# Patient Record
Sex: FEMALE | Race: BLACK OR AFRICAN AMERICAN | NOT HISPANIC OR LATINO | Employment: FULL TIME | URBAN - METROPOLITAN AREA
[De-identification: names, ages, dates, MRNs, and addresses within clinical notes are randomized per-mention and may not be internally consistent; named-entity substitution may affect disease eponyms.]

---

## 2017-10-13 ENCOUNTER — APPOINTMENT (EMERGENCY)
Dept: CT IMAGING | Facility: HOSPITAL | Age: 42
End: 2017-10-13

## 2017-10-13 ENCOUNTER — HOSPITAL ENCOUNTER (EMERGENCY)
Facility: HOSPITAL | Age: 42
Discharge: HOME | End: 2017-10-13
Attending: EMERGENCY MEDICINE | Admitting: EMERGENCY MEDICINE

## 2017-10-13 VITALS
OXYGEN SATURATION: 100 % | RESPIRATION RATE: 16 BRPM | HEART RATE: 58 BPM | TEMPERATURE: 98.7 F | SYSTOLIC BLOOD PRESSURE: 149 MMHG | DIASTOLIC BLOOD PRESSURE: 79 MMHG | WEIGHT: 183 LBS

## 2017-10-13 DIAGNOSIS — R10.31 RIGHT LOWER QUADRANT ABDOMINAL PAIN: Primary | ICD-10-CM

## 2017-10-13 LAB
ANION GAP SERPL CALCULATED.3IONS-SCNC: 8 MMOL/L (ref 4–13)
BASOPHILS # BLD AUTO: 0.05 THOUSANDS/ΜL (ref 0–0.1)
BASOPHILS NFR BLD AUTO: 1 % (ref 0–1)
BUN SERPL-MCNC: 11 MG/DL (ref 5–25)
CALCIUM SERPL-MCNC: 9.6 MG/DL (ref 8.3–10.1)
CHLORIDE SERPL-SCNC: 106 MMOL/L (ref 100–108)
CLARITY, POC: CLEAR
CO2 SERPL-SCNC: 29 MMOL/L (ref 21–32)
COLOR, POC: YELLOW
CREAT SERPL-MCNC: 0.91 MG/DL (ref 0.6–1.3)
EOSINOPHIL # BLD AUTO: 0.09 THOUSAND/ΜL (ref 0–0.61)
EOSINOPHIL NFR BLD AUTO: 1 % (ref 0–6)
ERYTHROCYTE [DISTWIDTH] IN BLOOD BY AUTOMATED COUNT: 15.6 % (ref 11.6–15.1)
EXT BILIRUBIN, UA: NEGATIVE
EXT BLOOD URINE: NORMAL
EXT GLUCOSE, UA: NEGATIVE
EXT KETONES: NEGATIVE
EXT NITRITE, UA: NEGATIVE
EXT PH, UA: 6.5
EXT PREG TEST URINE: NEGATIVE
EXT PROTEIN, UA: NORMAL
EXT SPECIFIC GRAVITY, UA: 1.02
EXT UROBILINOGEN: 0.2
GFR SERPL CREATININE-BSD FRML MDRD: 91 ML/MIN/1.73SQ M
GLUCOSE SERPL-MCNC: 87 MG/DL (ref 65–140)
HCT VFR BLD AUTO: 37.6 % (ref 34.8–46.1)
HGB BLD-MCNC: 11.6 G/DL (ref 11.5–15.4)
LYMPHOCYTES # BLD AUTO: 2.58 THOUSANDS/ΜL (ref 0.6–4.47)
LYMPHOCYTES NFR BLD AUTO: 34 % (ref 14–44)
MCH RBC QN AUTO: 26.5 PG (ref 26.8–34.3)
MCHC RBC AUTO-ENTMCNC: 30.9 G/DL (ref 31.4–37.4)
MCV RBC AUTO: 86 FL (ref 82–98)
MONOCYTES # BLD AUTO: 0.72 THOUSAND/ΜL (ref 0.17–1.22)
MONOCYTES NFR BLD AUTO: 10 % (ref 4–12)
NEUTROPHILS # BLD AUTO: 4.17 THOUSANDS/ΜL (ref 1.85–7.62)
NEUTS SEG NFR BLD AUTO: 54 % (ref 43–75)
PLATELET # BLD AUTO: 376 THOUSANDS/UL (ref 149–390)
PMV BLD AUTO: 9 FL (ref 8.9–12.7)
POTASSIUM SERPL-SCNC: 3.7 MMOL/L (ref 3.5–5.3)
RBC # BLD AUTO: 4.37 MILLION/UL (ref 3.81–5.12)
SODIUM SERPL-SCNC: 143 MMOL/L (ref 136–145)
WBC # BLD AUTO: 7.61 THOUSAND/UL (ref 4.31–10.16)
WBC # BLD EST: NEGATIVE 10*3/UL

## 2017-10-13 PROCEDURE — 36415 COLL VENOUS BLD VENIPUNCTURE: CPT | Performed by: EMERGENCY MEDICINE

## 2017-10-13 PROCEDURE — 85025 COMPLETE CBC W/AUTO DIFF WBC: CPT | Performed by: EMERGENCY MEDICINE

## 2017-10-13 PROCEDURE — 99284 EMERGENCY DEPT VISIT MOD MDM: CPT

## 2017-10-13 PROCEDURE — 74177 CT ABD & PELVIS W/CONTRAST: CPT

## 2017-10-13 PROCEDURE — 81002 URINALYSIS NONAUTO W/O SCOPE: CPT | Performed by: EMERGENCY MEDICINE

## 2017-10-13 PROCEDURE — 96361 HYDRATE IV INFUSION ADD-ON: CPT

## 2017-10-13 PROCEDURE — 96360 HYDRATION IV INFUSION INIT: CPT

## 2017-10-13 PROCEDURE — 81025 URINE PREGNANCY TEST: CPT | Performed by: EMERGENCY MEDICINE

## 2017-10-13 PROCEDURE — 80048 BASIC METABOLIC PNL TOTAL CA: CPT | Performed by: EMERGENCY MEDICINE

## 2017-10-13 RX ORDER — NAPROXEN 500 MG/1
500 TABLET ORAL 2 TIMES DAILY PRN
Qty: 20 TABLET | Refills: 0 | Status: SHIPPED | OUTPATIENT
Start: 2017-10-13 | End: 2020-01-27

## 2017-10-13 RX ADMIN — IOHEXOL 100 ML: 350 INJECTION, SOLUTION INTRAVENOUS at 18:51

## 2017-10-13 RX ADMIN — SODIUM CHLORIDE 1000 ML: 0.9 INJECTION, SOLUTION INTRAVENOUS at 18:03

## 2017-10-13 NOTE — ED PROVIDER NOTES
History  Chief Complaint   Patient presents with    Abdominal Pain     c/o right lower abdominal pain, which started 07/2017  History provided by:  Patient and spouse  Abdominal Pain   Pain location:  RLQ  Pain quality: aching    Pain radiates to:  Does not radiate  Pain severity:  Moderate  Duration: for past 3 months, pain lasts about 2 weeks, starts middle and ends at and of periods each month  Timing:  Intermittent  Progression:  Worsening  Chronicity:  New  Context: not trauma    Relieved by:  None tried  Worsened by:  Nothing  Ineffective treatments:  None tried  Associated symptoms: no anorexia, no chest pain, no chills, no constipation, no cough, no diarrhea, no dysuria, no fever, no hematemesis, no hematochezia, no hematuria, no nausea, no shortness of breath, no sore throat, no vaginal bleeding, no vaginal discharge and no vomiting    Risk factors: not pregnant        None       No past medical history on file  Past Surgical History:   Procedure Laterality Date    BREAST SURGERY      TUBAL LIGATION         No family history on file  I have reviewed and agree with the history as documented  Social History   Substance Use Topics    Smoking status: Never Smoker    Smokeless tobacco: Never Used    Alcohol use No        Review of Systems   Constitutional: Negative for activity change, chills, diaphoresis and fever  HENT: Negative for congestion, sinus pressure and sore throat  Eyes: Negative for pain and visual disturbance  Respiratory: Negative for cough, chest tightness, shortness of breath, wheezing and stridor  Cardiovascular: Negative for chest pain and palpitations  Gastrointestinal: Positive for abdominal pain  Negative for abdominal distention, anorexia, constipation, diarrhea, hematemesis, hematochezia, nausea and vomiting  Genitourinary: Negative for dysuria, frequency, hematuria, vaginal bleeding and vaginal discharge     Musculoskeletal: Negative for neck pain and neck stiffness  Skin: Negative for rash  Neurological: Negative for dizziness, speech difficulty, light-headedness, numbness and headaches  Physical Exam  ED Triage Vitals [10/13/17 1714]   Temperature Pulse Respirations Blood Pressure SpO2   98 7 °F (37 1 °C) 60 16 135/78 99 %      Temp Source Heart Rate Source Patient Position - Orthostatic VS BP Location FiO2 (%)   Oral Monitor Sitting Right arm --      Pain Score       4           Physical Exam   Constitutional: She is oriented to person, place, and time  She appears well-developed  No distress  HENT:   Head: Normocephalic and atraumatic  Eyes: Pupils are equal, round, and reactive to light  Neck: Normal range of motion  Neck supple  No tracheal deviation present  Cardiovascular: Normal rate, regular rhythm, normal heart sounds and intact distal pulses  No murmur heard  Pulmonary/Chest: Effort normal and breath sounds normal  No stridor  No respiratory distress  Abdominal: Soft  She exhibits no distension  There is no tenderness  There is no rebound and no guarding  Musculoskeletal: Normal range of motion  Neurological: She is alert and oriented to person, place, and time  Skin: Skin is warm and dry  She is not diaphoretic  No erythema  No pallor  Psychiatric: She has a normal mood and affect  Vitals reviewed        ED Medications  Medications   sodium chloride 0 9 % bolus 1,000 mL (0 mL Intravenous Stopped 10/13/17 1947)   iohexol (OMNIPAQUE) 350 MG/ML injection (SINGLE-DOSE) 100 mL (100 mL Intravenous Given 10/13/17 1851)       Diagnostic Studies  Labs Reviewed   CBC AND DIFFERENTIAL - Abnormal        Result Value Ref Range Status    MCH 26 5 (*) 26 8 - 34 3 pg Final    MCHC 30 9 (*) 31 4 - 37 4 g/dL Final    RDW 15 6 (*) 11 6 - 15 1 % Final    WBC 7 61  4 31 - 10 16 Thousand/uL Final    RBC 4 37  3 81 - 5 12 Million/uL Final    Hemoglobin 11 6  11 5 - 15 4 g/dL Final    Hematocrit 37 6  34 8 - 46 1 % Final    MCV 86  82 - 98 fL Final    MPV 9 0  8 9 - 12 7 fL Final    Platelets 494  617 - 390 Thousands/uL Final    Neutrophils Relative 54  43 - 75 % Final    Lymphocytes Relative 34  14 - 44 % Final    Monocytes Relative 10  4 - 12 % Final    Eosinophils Relative 1  0 - 6 % Final    Basophils Relative 1  0 - 1 % Final    Neutrophils Absolute 4 17  1 85 - 7 62 Thousands/µL Final    Lymphocytes Absolute 2 58  0 60 - 4 47 Thousands/µL Final    Monocytes Absolute 0 72  0 17 - 1 22 Thousand/µL Final    Eosinophils Absolute 0 09  0 00 - 0 61 Thousand/µL Final    Basophils Absolute 0 05  0 00 - 0 10 Thousands/µL Final   POCT URINALYSIS DIPSTICK - Normal    Color, UA Yellow   Final    Clarity, UA Clear   Final    EXT Glucose, UA Negative   Final    EXT Bilirubin, UA (Ref: Negative) Negative   Final    EXT Ketones, UA (Ref: Negative) Negative   Final    EXT Spec Grav, UA 1 020   Final    EXT Blood, UA (Ref: Negative) Small   Final    EXT pH, UA 6 5   Final    EXT Protein, UA (Ref: Negative) Trace   Final    EXT Urobilinogen, UA (Ref: 0 2- 1 0) 0 2   Final    EXT Leukocytes, UA (Ref: Negative) Negative   Final    EXT Nitrite, UA (Ref: Negative) Negative   Final   POCT PREGNANCY, URINE - Normal    EXT PREG TEST UR (Ref: Negative) Negative   Final   BASIC METABOLIC PANEL    Sodium 444  136 - 145 mmol/L Final    Potassium 3 7  3 5 - 5 3 mmol/L Final    Chloride 106  100 - 108 mmol/L Final    CO2 29  21 - 32 mmol/L Final    Anion Gap 8  4 - 13 mmol/L Final    BUN 11  5 - 25 mg/dL Final    Creatinine 0 91  0 60 - 1 30 mg/dL Final    Comment: Standardized to IDMS reference method    Glucose 87  65 - 140 mg/dL Final    Comment:   If the patient is fasting, the ADA then defines impaired fasting glucose as > 100 mg/dL and diabetes as > or equal to 123 mg/dL  Specimen collection should occur prior to Sulfasalazine administration due to the potential for falsely depressed results   Specimen collection should occur prior to Sulfapyridine administration due to the potential for falsely elevated results  Calcium 9 6  8 3 - 10 1 mg/dL Final    eGFR 91  ml/min/1 73sq m Final    Narrative:     National Kidney Disease Education Program recommendations are as follows:  GFR calculation is accurate only with a steady state creatinine  Chronic Kidney disease less than 60 ml/min/1 73 sq  meters  Kidney failure less than 15 ml/min/1 73 sq  meters  CT abdomen pelvis with contrast   Final Result      No acute intra-abdominal abnormality  No free air or free fluid  Normal appendix visualized  Workstation performed: [id number]             Procedures  Procedures      Phone Contacts  ED Phone Contact    ED Course  ED Course                                MDM  Number of Diagnoses or Management Options  Right lower quadrant abdominal pain: new and requires workup     Amount and/or Complexity of Data Reviewed  Clinical lab tests: ordered and reviewed  Tests in the radiology section of CPT®: ordered and reviewed  Decide to obtain previous medical records or to obtain history from someone other than the patient: yes  Obtain history from someone other than the patient: yes  Review and summarize past medical records: yes  Independent visualization of images, tracings, or specimens: yes      CritCare Time    Disposition  Final diagnoses:   Right lower quadrant abdominal pain     ED Disposition     ED Disposition Condition Comment    Discharge  [facility] discharge to home/self care  Condition at discharge: Good        Follow-up Information    None       Patient's Medications   Discharge Prescriptions    NAPROXEN (NAPROSYN) 500 MG TABLET    Take 1 tablet by mouth 2 (two) times a day as needed for mild pain       Start Date: 10/13/2017End Date: --       Order Dose: 500 mg       Quantity: 20 tablet    Refills: 0     No discharge procedures on file      ED Provider  Electronically Signed by       Tonny Lutz DO  10/13/17 1954

## 2017-10-13 NOTE — DISCHARGE INSTRUCTIONS
Abdominal Pain   WHAT YOU NEED TO KNOW:   Abdominal pain can be dull, achy, or sharp  You may have pain in one area of your abdomen, or in your entire abdomen  Your pain may be caused by a condition such as constipation, food sensitivity or poisoning, infection, or a blockage  Abdominal pain can also be from a hernia, appendicitis, or an ulcer  Liver, gallbladder, or kidney conditions can also cause abdominal pain  The cause of your abdominal pain may be unknown  DISCHARGE INSTRUCTIONS:   Return to the emergency department if:   · You have new chest pain or shortness of breath  · You have pulsing pain in your upper abdomen or lower back that suddenly becomes constant  · Your pain is in the right lower abdominal area and worsens with movement  · You have a fever over 100 4°F (38°C) or shaking chills  · You are vomiting and cannot keep food or liquids down  · Your pain does not improve or gets worse over the next 8 to 12 hours  · You see blood in your vomit or bowel movements, or they look black and tarry  · Your skin or the whites of your eyes turn yellow  · You are a woman and have a large amount of vaginal bleeding that is not your monthly period  Contact your healthcare provider if:   · You have pain in your lower back  · You are a man and have pain in your testicles  · You have pain when you urinate  · You have questions or concerns about your condition or care  Follow up with your healthcare provider within 24 hours or as directed:  Write down your questions so you remember to ask them during your visits  Medicines:   · Medicines  may be given to calm your stomach and prevent vomiting or to decrease pain  Ask how to take pain medicine safely  · Take your medicine as directed  Contact your healthcare provider if you think your medicine is not helping or if you have side effects  Tell him of her if you are allergic to any medicine   Keep a list of the medicines, vitamins, and herbs you take  Include the amounts, and when and why you take them  Bring the list or the pill bottles to follow-up visits  Carry your medicine list with you in case of an emergency  © 2017 [employer] Information is for End User's use only and may not be sold, redistributed or otherwise used for commercial purposes  All illustrations and images included in CareNotes® are the copyrighted property of A D A M [employer] Inc  or Keshav Baumann  The above information is an  only  It is not intended as medical advice for individual conditions or treatments  Talk to your doctor, nurse or pharmacist before following any medical regimen to see if it is safe and effective for you

## 2019-01-24 ENCOUNTER — TELEPHONE (OUTPATIENT)
Dept: OBGYN CLINIC | Facility: CLINIC | Age: 44
End: 2019-01-24

## 2019-01-28 ENCOUNTER — OFFICE VISIT (OUTPATIENT)
Dept: OBGYN CLINIC | Facility: CLINIC | Age: 44
End: 2019-01-28
Payer: COMMERCIAL

## 2019-01-28 VITALS
WEIGHT: 195 LBS | BODY MASS INDEX: 28.88 KG/M2 | DIASTOLIC BLOOD PRESSURE: 68 MMHG | HEIGHT: 69 IN | SYSTOLIC BLOOD PRESSURE: 118 MMHG

## 2019-01-28 DIAGNOSIS — N93.9 ABNORMAL BLEEDING IN MENSTRUAL CYCLE: ICD-10-CM

## 2019-01-28 DIAGNOSIS — D50.9 IRON DEFICIENCY ANEMIA, UNSPECIFIED IRON DEFICIENCY ANEMIA TYPE: ICD-10-CM

## 2019-01-28 DIAGNOSIS — Z30.09 COUNSELING FOR INITIATION OF BIRTH CONTROL METHOD: ICD-10-CM

## 2019-01-28 DIAGNOSIS — R10.2 PELVIC PAIN: ICD-10-CM

## 2019-01-28 DIAGNOSIS — Z12.39 SCREENING FOR MALIGNANT NEOPLASM OF BREAST: ICD-10-CM

## 2019-01-28 DIAGNOSIS — Z01.419 ENCOUNTER FOR GYNECOLOGICAL EXAMINATION (GENERAL) (ROUTINE) WITHOUT ABNORMAL FINDINGS: Primary | ICD-10-CM

## 2019-01-28 PROBLEM — N92.6 ABNORMAL BLEEDING IN MENSTRUAL CYCLE: Status: ACTIVE | Noted: 2019-01-28

## 2019-01-28 PROCEDURE — G0145 SCR C/V CYTO,THINLAYER,RESCR: HCPCS | Performed by: NURSE PRACTITIONER

## 2019-01-28 PROCEDURE — 87624 HPV HI-RISK TYP POOLED RSLT: CPT | Performed by: NURSE PRACTITIONER

## 2019-01-28 PROCEDURE — 99386 PREV VISIT NEW AGE 40-64: CPT | Performed by: NURSE PRACTITIONER

## 2019-01-28 RX ORDER — NORETHINDRONE ACETATE AND ETHINYL ESTRADIOL AND FERROUS FUMARATE 1MG-20(24)
1 KIT ORAL DAILY
Qty: 90 TABLET | Refills: 3 | Status: SHIPPED | OUTPATIENT
Start: 2019-01-28 | End: 2020-01-27

## 2019-01-28 RX ORDER — FERROUS SULFATE 325(65) MG
325 TABLET ORAL
COMMUNITY
End: 2020-01-27

## 2019-01-28 NOTE — PROGRESS NOTES
Assessment/Plan:    Encounter for gynecological examination (general) (routine) without abnormal findings  Normal findings on routine gyn exam  Advised monthly SBE, annual CBE and yearly mammogram  Reviewed ASCCP guidelines  Patient has hx of abnormal paps, but no records present at this time  Pap with cotesting collected today  STI testing was offered and the patient declines; she reports low risk  The patient desires to initiate OCP d/t menstrual cycle and d/t hx of pregnancy after a tubal ligation  Diet/activity recommendations  RTO in 3 months for pill check and one year for annual gyn exam or sooner PRN  Counseling for initiation of birth control method  This patient presents requesting trial of OCP to help improve menstrual cycle and for hx of pregnancy after tubal ligation  We discussed all options at length including combination estrogen progesterone methods (pill, patch and ring), progesterone only methods (pill, Depo, Nexplanon and IUD)  We reviewed directions for use, Ses/AEs, risks and benefits  The patient's personal and FH were reviewed and she is without risk factors for VTE  Prescription provided for OCP-Loestrin, reviewed Sunday start, and reasons to call  Return in 3 months for pill check  Pelvic pain  Long standing history of RLQ pain and right sided pelvic fullness in the last days of her period  Has hx of uterine polyps x 2 that were removed  Pelvic ultrasound ordered  Iron deficiency anemia  Patient does not recall the last time she has had her iron level check and is requesting an order for this  Slip provided today  Abnormal bleeding in menstrual cycle  TSH, pelvic ultrasound, and pap ordered today  Started on OCP Loestrin  Will follow up with results         Diagnoses and all orders for this visit:    Encounter for gynecological examination (general) (routine) without abnormal findings  -     Liquid-based pap, screening    Screening for malignant neoplasm of breast  - Mammo screening bilateral w cad; Future    Pelvic pain  -     US pelvis complete non OB; Future    Abnormal bleeding in menstrual cycle  -     TSH, 3rd generation with Free T4 reflex; Future  -     norethindrone-ethinyl estradiol-ferrous fumarate (LOESTIN 24 FE) 1-20 MG-MCG(24) per tablet; Take 1 tablet by mouth daily    Iron deficiency anemia, unspecified iron deficiency anemia type  -     Iron; Future    Counseling for initiation of birth control method    Other orders  -     ferrous sulfate 325 (65 Fe) mg tablet; Take 325 mg by mouth daily with breakfast          Subjective:      Patient ID: Susie Alcala is a 41 y o  female  Xuan Graves is a new patient who presents for routine annual gyn exam    Xuan Graves c/o RLQ pain that she describes as sharp, cramping  She feels a "fullness" on the right side of her pelvis and it always occurs in the last few days of her period  Patient began experiencing this in June 2017 and was seen once in the ER for this in 2017  CT of her abdomen at that time was normal  The pain is usually relieved with Ibuprofen  Patient also c/o of periods every 14 days that usually last 9 days  She states her periods have been like for 6 years  Was on put on an OCP-Ortho 28 in the past to regulate this, but didn't like it d/t vaginal dryness  Patient also has tried the patch in the past   She denies breast concerns, abnormal discharge, bowel/bladder dysfunction, depression/anxiety  Gyn hx is notable for abnormal paps requiring colposcopy and cryo, but does not recall when  She says her last pap was normal and thinks it was in 2016, but is unsure  Hx of uterine polyps x 2 that she had D and C for  Tubal in 2010 and conceived after her tubal in 2011, which resulted in a miscarriage  Hx vaginal delivery x 3  Last mammogram was 2012 per patient  Hx of breast ca in [facility] and her sister  PMhx of anemia -takes 325mg PO iron   and monogamous  She denies STI concerns   BTL for contraception, but  withdraws d/t getting pregnant after her tubal  Desires initiation of OCP today  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  Has 23 and 14 yo sons  7 yo daughter  The following portions of the patient's history were reviewed and updated as appropriate: allergies, current medications, past family history, past medical history, past social history, past surgical history and problem list     Review of Systems   Constitutional: Negative  Denies breast concerns  HENT: Negative  Eyes: Negative  Respiratory: Negative  Cardiovascular: Negative  Gastrointestinal: Positive for abdominal pain  RLQ   Endocrine: Negative  Genitourinary: Positive for menstrual problem and pelvic pain  Musculoskeletal: Negative  Skin: Negative  Allergic/Immunologic: Negative  Neurological: Negative  Hematological: Negative  Psychiatric/Behavioral: Negative  Objective:      /68 (BP Location: Left arm, Patient Position: Sitting, Cuff Size: Large)   Ht 5' 9" (1 753 m)   Wt 88 5 kg (195 lb)   LMP 01/08/2019 (Exact Date)   BMI 28 80 kg/m²          Physical Exam   Constitutional: She is oriented to person, place, and time  She appears well-developed and well-nourished  HENT:   Head: Normocephalic and atraumatic  Eyes: Pupils are equal, round, and reactive to light  Conjunctivae and EOM are normal    Neck: Normal range of motion  Neck supple  No thyromegaly present  Cardiovascular: Normal rate, regular rhythm and normal heart sounds  Pulmonary/Chest: Effort normal and breath sounds normal  No respiratory distress  She has no wheezes  She has no rhonchi  She has no rales  She exhibits no mass, no tenderness and no deformity  Right breast exhibits no inverted nipple, no mass, no nipple discharge, no skin change and no tenderness  Left breast exhibits no inverted nipple, no mass, no nipple discharge, no skin change and no tenderness   Breasts are symmetrical  Abdominal: Soft  There is no splenomegaly or hepatomegaly  There is no tenderness  There is no rebound and no guarding  Genitourinary: Rectum normal, vagina normal and uterus normal  No breast swelling, tenderness, discharge or bleeding  No labial fusion  There is no rash, tenderness, lesion or injury on the right labia  There is no rash, tenderness, lesion or injury on the left labia  Cervix exhibits no motion tenderness, no discharge and no friability  Right adnexum displays no mass, no tenderness and no fullness  Left adnexum displays no mass, no tenderness and no fullness  No erythema, tenderness or bleeding in the vagina  No foreign body in the vagina  No signs of injury around the vagina  No vaginal discharge found  Musculoskeletal: Normal range of motion  Lymphadenopathy:     She has no cervical adenopathy  She has no axillary adenopathy  Neurological: She is alert and oriented to person, place, and time  No cranial nerve deficit  Skin: Skin is warm, dry and intact  No rash noted  No cyanosis  Nails show no clubbing  Psychiatric: She has a normal mood and affect  Her speech is normal and behavior is normal  Judgment and thought content normal  Cognition and memory are normal    Vitals reviewed

## 2019-01-28 NOTE — ASSESSMENT & PLAN NOTE
Patient does not recall the last time she has had her iron level check and is requesting an order for this  Slip provided today

## 2019-01-28 NOTE — ASSESSMENT & PLAN NOTE
TSH, pelvic ultrasound, and pap ordered today  Started on OCP Loestrin  Will follow up with results

## 2019-01-28 NOTE — ASSESSMENT & PLAN NOTE
Normal findings on routine gyn exam  Advised monthly SBE, annual CBE and yearly mammogram  Reviewed ASCCP guidelines  Patient has hx of abnormal paps, but no records present at this time  Pap with cotesting collected today  STI testing was offered and the patient declines; she reports low risk  The patient desires to initiate OCP d/t menstrual cycle and d/t hx of pregnancy after a tubal ligation  Diet/activity recommendations  RTO in 3 months for pill check and one year for annual gyn exam or sooner PRN

## 2019-01-28 NOTE — ASSESSMENT & PLAN NOTE
Long standing history of RLQ pain and right sided pelvic fullness in the last days of her period  Has hx of uterine polyps x 2 that were removed  Pelvic ultrasound ordered

## 2019-01-28 NOTE — ASSESSMENT & PLAN NOTE
This patient presents requesting trial of OCP to help improve menstrual cycle and for hx of pregnancy after tubal ligation  We discussed all options at length including combination estrogen progesterone methods (pill, patch and ring), progesterone only methods (pill, Depo, Nexplanon and IUD)  We reviewed directions for use, Ses/AEs, risks and benefits  The patient's personal and FH were reviewed and she is without risk factors for VTE  Prescription provided for OCP-Loestrin, reviewed Sunday start, and reasons to call  Return in 3 months for pill check

## 2019-01-30 LAB
HPV HR 12 DNA CVX QL NAA+PROBE: NEGATIVE
HPV16 DNA CVX QL NAA+PROBE: NEGATIVE
HPV18 DNA CVX QL NAA+PROBE: NEGATIVE

## 2019-01-31 LAB
LAB AP GYN PRIMARY INTERPRETATION: NORMAL
Lab: NORMAL

## 2019-08-19 ENCOUNTER — HOSPITAL ENCOUNTER (OUTPATIENT)
Dept: RADIOLOGY | Facility: HOSPITAL | Age: 44
Discharge: HOME | End: 2019-08-19
Payer: COMMERCIAL

## 2019-08-19 VITALS — BODY MASS INDEX: 28.88 KG/M2 | WEIGHT: 195 LBS | HEIGHT: 69 IN

## 2019-08-19 DIAGNOSIS — Z12.39 SCREENING FOR MALIGNANT NEOPLASM OF BREAST: ICD-10-CM

## 2019-08-19 PROCEDURE — 77067 SCR MAMMO BI INCL CAD: CPT

## 2019-08-28 ENCOUNTER — TELEPHONE (OUTPATIENT)
Dept: OBGYN CLINIC | Facility: CLINIC | Age: 44
End: 2019-08-28

## 2019-08-28 DIAGNOSIS — R92.8 ABNORMAL MAMMOGRAM: ICD-10-CM

## 2019-08-28 DIAGNOSIS — R92.2 DENSE BREAST TISSUE ON MAMMOGRAM: Primary | ICD-10-CM

## 2019-08-28 NOTE — TELEPHONE ENCOUNTER
Patient is aware of her results and that she needs additional views on her left breast  Orders in pt chart  Transferred pt to central scheduling to make an appt

## 2019-08-28 NOTE — TELEPHONE ENCOUNTER
----- Message from Burke Alfaro PA-C sent at 8/28/2019  5:39 PM EDT -----  Needs additional views  Inga menard

## 2019-08-29 ENCOUNTER — HOSPITAL ENCOUNTER (OUTPATIENT)
Dept: MAMMOGRAPHY | Facility: CLINIC | Age: 44
Discharge: HOME | End: 2019-08-29
Payer: COMMERCIAL

## 2019-08-29 ENCOUNTER — HOSPITAL ENCOUNTER (OUTPATIENT)
Dept: ULTRASOUND IMAGING | Facility: CLINIC | Age: 44
Discharge: HOME | End: 2019-08-29
Payer: COMMERCIAL

## 2019-08-29 ENCOUNTER — APPOINTMENT (OUTPATIENT)
Dept: MAMMOGRAPHY | Facility: CLINIC | Age: 44
End: 2019-08-29
Payer: COMMERCIAL

## 2019-08-29 DIAGNOSIS — R92.8 ABNORMAL MAMMOGRAM: ICD-10-CM

## 2019-08-29 PROCEDURE — G0279 TOMOSYNTHESIS, MAMMO: HCPCS

## 2019-08-29 PROCEDURE — 76642 ULTRASOUND BREAST LIMITED: CPT

## 2019-08-29 PROCEDURE — 77066 DX MAMMO INCL CAD BI: CPT

## 2020-01-27 ENCOUNTER — ANNUAL EXAM (OUTPATIENT)
Dept: OBGYN CLINIC | Facility: CLINIC | Age: 45
End: 2020-01-27
Payer: COMMERCIAL

## 2020-01-27 ENCOUNTER — TELEPHONE (OUTPATIENT)
Dept: OBGYN CLINIC | Facility: CLINIC | Age: 45
End: 2020-01-27

## 2020-01-27 VITALS — DIASTOLIC BLOOD PRESSURE: 88 MMHG | WEIGHT: 189.8 LBS | SYSTOLIC BLOOD PRESSURE: 140 MMHG | BODY MASS INDEX: 28.03 KG/M2

## 2020-01-27 DIAGNOSIS — Z30.09 COUNSELING FOR BIRTH CONTROL REGARDING INTRAUTERINE DEVICE (IUD): ICD-10-CM

## 2020-01-27 DIAGNOSIS — Z01.419 ENCOUNTER FOR GYNECOLOGICAL EXAMINATION (GENERAL) (ROUTINE) WITHOUT ABNORMAL FINDINGS: Primary | ICD-10-CM

## 2020-01-27 DIAGNOSIS — Z12.31 SCREENING MAMMOGRAM, ENCOUNTER FOR: ICD-10-CM

## 2020-01-27 PROCEDURE — 99396 PREV VISIT EST AGE 40-64: CPT | Performed by: NURSE PRACTITIONER

## 2020-01-27 NOTE — TELEPHONE ENCOUNTER
Patient would like a IUD Mirena  Mirena is for menses and cramping- patient has a tubal  Can be scheduled with next menses cycle

## 2020-01-27 NOTE — PROGRESS NOTES
[address]  1975      CC:  Yearly exam    S:  45 y o  female here for yearly exam  She denies breast concerns, abdominal pain, abnormal vaginal discharge, bladder/bowel dysfunction  She continues to c/o right sided pelvic pain, only during her menses  She did complete pelvic ultrasound last month-19-which was normal  Her cycles are typically every 16 days  It was recommended at her annual gyn last year to complete TSH/CBC, but she never did this  She is sexually active with   Denies STI concerns  She uses tubal and withdrawal for contraception, d/t pregnancy after her tubal    Patient did trial OCP for 3 months, which improved her right sided pelvic pain, but patient c/o vaginal dryness, decreased libido, and difficulty remembering to take OCP so she is no longer taking this  Repeat BP is 138/88  [id number] x 3    Last Pap: 19-neg/neg   Last Mammo: 19-some asymmetries seen  Needs repeat b/l diagnostic mammogram next month 2020  Son 20 is autistic  Son 12 and daughter 6  No current outpatient medications on file    Social History     Socioeconomic History    Marital status: /Civil Union     Spouse name: Not on file    Number of children: Not on file    Years of education: Not on file    Highest education level: Not on file   Occupational History    Not on file   Social Needs    Financial resource strain: Not on file    Food insecurity:     Worry: Not on file     Inability: Not on file    Transportation needs:     Medical: Not on file     Non-medical: Not on file   Tobacco Use    Smoking status: Never Smoker    Smokeless tobacco: Never Used   Substance and Sexual Activity    Alcohol use: No    Drug use: No    Sexual activity: Yes     Birth control/protection: Female Sterilization     Comment: withdrawel   Lifestyle    Physical activity:     Days per week: Not on file     Minutes per session: Not on file    Stress: Not on file   Relationships    Social connections:     Talks on phone: Not on file     Gets together: Not on file     Attends Mandaeism service: Not on file     Active member of club or organization: Not on file     Attends meetings of clubs or organizations: Not on file     Relationship status: Not on file    Intimate partner violence:     Fear of current or ex partner: Not on file     Emotionally abused: Not on file     Physically abused: Not on file     Forced sexual activity: Not on file   Other Topics Concern    Not on file   Social History Narrative    Not on file     Family History   Problem Relation Age of Onset    Sarcoidosis Father     Breast cancer Sister 39    Breast cancer Maternal Grandmother 79      Past Medical History:   Diagnosis Date    Abnormal Pap smear of cervix         O:  Blood pressure 140/88, weight 86 1 kg (189 lb 12 8 oz), last menstrual period 01/11/2020  Patient appears well and is not in distress  Neck is supple without masses  Breasts are symmetrical without mass, tenderness, nipple discharge, skin changes or adenopathy  Abdomen is soft and nontender without masses  External genitals are normal without lesions or rashes  Vagina is normal without discharge or bleeding  Cervix is normal without discharge or lesion  Uterus is normal, mobile, nontender without palpable mass  Adnexa are normal, nontender, without palpable mass  A:  Yearly exam      P:   Pap up to date  Reviewed ASCCP guidelines with patient  Pap due 2024  Mammo slip provided for next month  Recommend monthly SBE, annual CBE, and annual screening mammogram   Discussed normal pelvic ultrasound  Discussed possible Mirena IUD to eventually eliminate menses, which would likely relieve right sided pelvic pain  Reviewed risks/SE, IUD insertion  Patient requesting a Mirena IUD  Will call patient for appointment when IUD in office and recommend insertion with menses  Also recommend TSH/CBC  RTO one year for yearly exam or sooner as needed

## 2020-01-28 NOTE — TELEPHONE ENCOUNTER
Pt needs a Thursday so she sched  For 2/13 in [place] w/Elly  She was told to have her period for insertion

## 2020-02-11 ENCOUNTER — PROCEDURE VISIT (OUTPATIENT)
Dept: OBGYN CLINIC | Facility: CLINIC | Age: 45
End: 2020-02-11
Payer: COMMERCIAL

## 2020-02-11 VITALS — BODY MASS INDEX: 27.85 KG/M2 | SYSTOLIC BLOOD PRESSURE: 144 MMHG | DIASTOLIC BLOOD PRESSURE: 96 MMHG | WEIGHT: 188.6 LBS

## 2020-02-11 DIAGNOSIS — Z30.430 ENCOUNTER FOR INSERTION OF MIRENA IUD: Primary | ICD-10-CM

## 2020-02-11 PROBLEM — R10.2 PELVIC PAIN: Status: RESOLVED | Noted: 2019-01-28 | Resolved: 2020-02-11

## 2020-02-11 PROBLEM — Z30.09 COUNSELING FOR BIRTH CONTROL REGARDING INTRAUTERINE DEVICE (IUD): Status: RESOLVED | Noted: 2020-01-27 | Resolved: 2020-02-11

## 2020-02-11 PROBLEM — Z30.09 COUNSELING FOR INITIATION OF BIRTH CONTROL METHOD: Status: RESOLVED | Noted: 2019-01-28 | Resolved: 2020-02-11

## 2020-02-11 PROCEDURE — 58300 INSERT INTRAUTERINE DEVICE: CPT | Performed by: NURSE PRACTITIONER

## 2020-02-11 NOTE — PROGRESS NOTES
Iud insertions  Date/Time: 2/11/2020 10:15 AM  Performed by: GARCIA Koch  Authorized by: GARCIA Koch     Consent:     Consent obtained:  Verbal and written    Consent given by:  Patient    Procedure risks and benefits discussed: yes      Patient questions answered: yes      Patient agrees, verbalizes understanding, and wants to proceed: yes      Educational handouts given: yes    Procedure:     Pelvic exam performed: yes      Cervix cleaned and prepped: yes      Speculum placed in vagina: yes      Tenaculum applied to cervix: yes      Uterus sounded: yes      Uterus sound depth (cm):  8    IUD inserted with no complications: yes      IUD type:  Mirena    Strings trimmed: yes    Post-procedure:     Patient tolerated procedure well: yes    Comments: This patient presents for IUD insertion  The risks/benefits, SE's/AE's were reviewed and all questions were answered  Written and verbal consent were obtained  Time out was performed and allergies were confirmed  The patient was positioned in lithotomy position and speculum was inserted  Cervix was visualized and cleansed with betadine  Tenaculum was applied to the anterior cervix  Uterus sounded to 8cm  The IUD was inserted without difficulty and the strings were trimmed  Hemostasis was observed and all instruments were removed  The patient tolerated well  Reviewed reasons to call and sx to report including excessive bleeding, pain unrelieved by ibuprofen or symptoms of infection such as fever, chills or foul smelling discharge  Recommended returning to the office in 4-6 weeks for IUD string check and utilizing condoms as back up until that time  The patient agrees to the plan

## 2020-03-16 ENCOUNTER — OFFICE VISIT (OUTPATIENT)
Dept: OBGYN CLINIC | Facility: CLINIC | Age: 45
End: 2020-03-16
Payer: COMMERCIAL

## 2020-03-16 VITALS — DIASTOLIC BLOOD PRESSURE: 80 MMHG | BODY MASS INDEX: 28.47 KG/M2 | SYSTOLIC BLOOD PRESSURE: 122 MMHG | WEIGHT: 192.8 LBS

## 2020-03-16 DIAGNOSIS — Z97.5 IUD (INTRAUTERINE DEVICE) IN PLACE: ICD-10-CM

## 2020-03-16 DIAGNOSIS — Z30.431 IUD CHECK UP: Primary | ICD-10-CM

## 2020-03-16 PROBLEM — Z30.430 ENCOUNTER FOR INSERTION OF MIRENA IUD: Status: RESOLVED | Noted: 2020-02-11 | Resolved: 2020-03-16

## 2020-03-16 PROCEDURE — 99213 OFFICE O/P EST LOW 20 MIN: CPT | Performed by: NURSE PRACTITIONER

## 2020-03-16 NOTE — PROGRESS NOTES
[address]  1975      CC: IUD check    S: 45 y o  female here for IUD check  She is status post placement of a Mirena IUD on 2/11/2020  She has had mild irregular bleeding since placement but nothing bothersome to her  Her last menses was lighter, but lasted a few more days  She has had no pain or other side effects  She overall is very happy with the IUD and desires to continue  Patient's last menstrual period was 02/27/2020 (exact date)      Current Outpatient Medications:     levonorgestrel (MIRENA) 20 MCG/24HR IUD, 1 each by Intrauterine route once, Disp: , Rfl:   Social History     Socioeconomic History    Marital status: /Civil Union     Spouse name: Not on file    Number of children: Not on file    Years of education: Not on file    Highest education level: Not on file   Occupational History    Not on file   Social Needs    Financial resource strain: Not on file    Food insecurity:     Worry: Not on file     Inability: Not on file    Transportation needs:     Medical: Not on file     Non-medical: Not on file   Tobacco Use    Smoking status: Never Smoker    Smokeless tobacco: Never Used   Substance and Sexual Activity    Alcohol use: No    Drug use: No    Sexual activity: Yes     Birth control/protection: Female Sterilization     Comment: withdrawel   Lifestyle    Physical activity:     Days per week: Not on file     Minutes per session: Not on file    Stress: Not on file   Relationships    Social connections:     Talks on phone: Not on file     Gets together: Not on file     Attends Denominational service: Not on file     Active member of club or organization: Not on file     Attends meetings of clubs or organizations: Not on file     Relationship status: Not on file    Intimate partner violence:     Fear of current or ex partner: Not on file     Emotionally abused: Not on file     Physically abused: Not on file     Forced sexual activity: Not on file   Other Topics Concern    Not on file   Social History Narrative    Not on file     Family History   Problem Relation Age of Onset    Sarcoidosis Father     Breast cancer Sister 39    Breast cancer Maternal Grandmother 79     Past Medical History:   Diagnosis Date    Abnormal Pap smear of cervix        O:  Blood pressure 122/80, weight 87 5 kg (192 lb 12 8 oz), last menstrual period 02/27/2020  Abdomen is soft and nontender  External genitals are normal without rashes or lesions  Vagina is normal without discharge or bleeding  Cervix is normal without discharge or lesion  IUD strings are normal      A:  IUD in place    P:  Patient was reassured that irregular bleeding is common for the first six months of IUD use and that this should slowly resolve over time  She will call with any persistently abnormal bleeding or other problems  She will return for her yearly exam 1/2021 or sooner prn

## 2020-04-17 ENCOUNTER — TELEPHONE (OUTPATIENT)
Dept: OBGYN CLINIC | Facility: CLINIC | Age: 45
End: 2020-04-17

## 2020-05-11 ENCOUNTER — OFFICE VISIT (OUTPATIENT)
Dept: OBGYN CLINIC | Facility: CLINIC | Age: 45
End: 2020-05-11
Payer: COMMERCIAL

## 2020-05-11 ENCOUNTER — APPOINTMENT (OUTPATIENT)
Dept: RADIOLOGY | Facility: AMBULARY SURGERY CENTER | Age: 45
End: 2020-05-11
Attending: SURGERY
Payer: COMMERCIAL

## 2020-05-11 VITALS
WEIGHT: 190 LBS | HEART RATE: 76 BPM | SYSTOLIC BLOOD PRESSURE: 139 MMHG | BODY MASS INDEX: 28.14 KG/M2 | HEIGHT: 69 IN | DIASTOLIC BLOOD PRESSURE: 86 MMHG

## 2020-05-11 DIAGNOSIS — R22.31 MASS OF RIGHT WRIST: ICD-10-CM

## 2020-05-11 DIAGNOSIS — R22.31 MASS OF RIGHT WRIST: Primary | ICD-10-CM

## 2020-05-11 PROCEDURE — 73110 X-RAY EXAM OF WRIST: CPT

## 2020-05-11 PROCEDURE — 99203 OFFICE O/P NEW LOW 30 MIN: CPT | Performed by: SURGERY

## 2020-05-13 ENCOUNTER — HOSPITAL ENCOUNTER (OUTPATIENT)
Dept: MAMMOGRAPHY | Facility: CLINIC | Age: 45
Discharge: HOME | End: 2020-05-13
Payer: COMMERCIAL

## 2020-05-13 VITALS — WEIGHT: 195 LBS | HEIGHT: 69 IN | BODY MASS INDEX: 28.88 KG/M2

## 2020-05-13 DIAGNOSIS — Z12.31 SCREENING MAMMOGRAM, ENCOUNTER FOR: ICD-10-CM

## 2020-05-13 PROCEDURE — G0279 TOMOSYNTHESIS, MAMMO: HCPCS

## 2020-05-13 PROCEDURE — 77066 DX MAMMO INCL CAD BI: CPT

## 2020-05-20 ENCOUNTER — TELEPHONE (OUTPATIENT)
Dept: OBGYN CLINIC | Facility: HOSPITAL | Age: 45
End: 2020-05-20

## 2020-12-15 ENCOUNTER — TRANSCRIBE ORDERS (OUTPATIENT)
Dept: ADMINISTRATIVE | Facility: HOSPITAL | Age: 45
End: 2020-12-15

## 2020-12-28 ENCOUNTER — HOSPITAL ENCOUNTER (OUTPATIENT)
Dept: RADIOLOGY | Facility: HOSPITAL | Age: 45
Discharge: HOME | End: 2020-12-28
Payer: COMMERCIAL

## 2020-12-28 DIAGNOSIS — R22.31 MASS OF RIGHT WRIST: ICD-10-CM

## 2020-12-28 PROCEDURE — 73221 MRI JOINT UPR EXTREM W/O DYE: CPT

## 2021-01-14 ENCOUNTER — OFFICE VISIT (OUTPATIENT)
Dept: OBGYN CLINIC | Facility: CLINIC | Age: 46
End: 2021-01-14
Payer: COMMERCIAL

## 2021-01-14 VITALS
HEIGHT: 69 IN | WEIGHT: 202 LBS | HEART RATE: 71 BPM | BODY MASS INDEX: 29.92 KG/M2 | SYSTOLIC BLOOD PRESSURE: 128 MMHG | DIASTOLIC BLOOD PRESSURE: 84 MMHG

## 2021-01-14 DIAGNOSIS — R22.31 MASS OF RIGHT WRIST: Primary | ICD-10-CM

## 2021-01-14 PROCEDURE — 99214 OFFICE O/P EST MOD 30 MIN: CPT | Performed by: SURGERY

## 2021-01-14 RX ORDER — DOXYCYCLINE HYCLATE 50 MG/1
CAPSULE, GELATIN COATED ORAL
COMMUNITY
Start: 2020-10-26

## 2021-01-14 RX ORDER — CHLORHEXIDINE GLUCONATE 0.12 MG/ML
15 RINSE ORAL ONCE
Status: CANCELLED | OUTPATIENT
Start: 2021-01-14 | End: 2021-01-14

## 2021-01-14 RX ORDER — ERGOCALCIFEROL 1.25 MG/1
CAPSULE ORAL
COMMUNITY
Start: 2020-10-26

## 2021-01-14 NOTE — H&P
ASSESSMENT/PLAN:      65-year-old female with volar radial right ganglion cyst adherent to radial artery  Patient and I discussed that she would benefit from surgical intervention of mass excision  Risks and benefits of surgery were discussed today in the office  We discussed because of the location of the cyst and with it being adherent to the radial artery that there is a chance with surgical excision that there may be injury to the artery  Risk of the surgery are inclusive of but not limited to bleeding, infection, nerve injury, blood clot, worsening of symptoms, not achieving the anticipated results, persistent stiffness, weakness and the need for additional surgery  The patient verbally stated they understood those risks and would like to proceed with the surgery  She will meet with my surgery scheduler today in the office to pick her surgical date and schedule her 1st postoperative visit pulled we discussed full recovery is expected in 6 weeks after surgery  I will see her back 10 days after surgery  Incidental finding of ulnar carpal abutment syndrome on MRI patient is asymptomatic at this time and not want pursue anything for this      I personally obtained a written consent after risks benefits alternatives were discussed in detail with the patient  The patient verbalized understanding of exam findings and treatment plan  We engaged in the shared decision-making process and treatment options were discussed at length with the patient  Surgical and conservative management discussed today along with risks and benefits  Diagnoses and all orders for this visit:    Mass of right wrist    Other orders  -     ergocalciferol (VITAMIN D2) 50,000 units; TAKE 1 CAPSULE(S) EVERY WEEK BY ORAL ROUTE IN THE MORNING FOR 90 DAYS  -     ferrous gluconate (FERGON) 324 mg tablet; TAKE 1 TABLET(S) EVERY DAY BY MOUTH BEFORE MEALS FOR 30 DAYS            Ganglion Cyst Excision: The anatomy and physiology of the ganglion was discussed with the patient today in the office  Fluid leaking out of the joint surface typically creates a small sac, which can enlarge and cause pain or limitation of motion  Treatment options include observation, aspiration, or surgical incision were discussed with the patient today  Observation typically lead to resolution and approximately 10% of patients, aspiration least resolution approximately 50% of patients, and surgical excision lead to resolution in approximately 97% of patients  After discussion with the patient today, the patient voiced understanding of all treatment options  The patient has elected excision of the ganglion  The risks and benefits of the procedure were explained to the patient, which include, but are not limited to: Bleeding, infection, recurrence, pain, scar, damage to tendons, damage to nerves, and damage to blood vessels, failure to give desired results and complications related to anesthesia  These risks, along with alternative conservative treatment options, and postoperative protocols were voiced back and understood by the patient  All questions were answered to the patient's satisfaction  The patient agrees to comply with a standard postoperative protocol, and is willing to proceed  Education was provided via written and auditory forms  There were no barriers to learning  Written handouts regarding wound care, incision and scar care, and general preoperative information was provided to the patient  Prior to surgery, the patient may be requested to stop all anti-inflammatory medications  Prophylactic aspirin, Plavix, and Coumadin may be allowed to be continued  Medications including vitamin E , ginkgo, and fish oil are requested to be stopped approximately one week prior to surgery  Hypertensive medications and beta blockers, if taken, should be continued  Follow Up:  No follow-ups on file        To Do Next Visit:  Re-evaluation of current issue    ____________________________________________________________________________________________________________________________________________      CHIEF COMPLAINT:  No chief complaint on file  SUBJECTIVE:  Benedict Diane is a 39y o  year old RHD female who presents the office today for a follow-up evaluation of her right radial volar wrist mass  Patient was able to obtain MRI prior to today's visit  She states she continues to have the mass present  The mass is not painful to her but does hurt when she bumps it  Patient reports that the mass is more of an annoyance to her and would like to have it removed  She denies any new injuries or trauma  She denies any numbness and tingling  She does note intermittently feeling that the wrist is a bit weak  I have personally reviewed all the relevant PMH, PSH, SH, FH, Medications and allergies       PAST MEDICAL HISTORY:  Past Medical History:   Diagnosis Date    Abnormal Pap smear of cervix     Allergic rhinitis     Dizziness        PAST SURGICAL HISTORY:  Past Surgical History:   Procedure Laterality Date    BREAST EXCISIONAL BIOPSY Left 1994    benign    TUBAL LIGATION         FAMILY HISTORY:  Family History   Problem Relation Age of Onset    Sarcoidosis Father     Breast cancer Sister 39    Breast cancer Maternal Grandmother 79       SOCIAL HISTORY:  Social History     Tobacco Use    Smoking status: Never Smoker    Smokeless tobacco: Never Used   Substance Use Topics    Alcohol use: No    Drug use: No       MEDICATIONS:    Current Outpatient Medications:     ergocalciferol (VITAMIN D2) 50,000 units, TAKE 1 CAPSULE(S) EVERY WEEK BY ORAL ROUTE IN THE MORNING FOR 90 DAYS , Disp: , Rfl:     ferrous gluconate (FERGON) 324 mg tablet, TAKE 1 TABLET(S) EVERY DAY BY MOUTH BEFORE MEALS FOR 30 DAYS , Disp: , Rfl:     levonorgestrel (MIRENA) 20 MCG/24HR IUD, 1 each by Intrauterine route once, Disp: , Rfl:     ALLERGIES:  Allergies   Allergen Reactions    Other      Seasonal    Shellfish-Derived Products        REVIEW OF SYSTEMS:  Review of Systems   Constitutional: Negative for chills, fever and unexpected weight change  HENT: Negative for hearing loss, nosebleeds and sore throat  Eyes: Negative for pain, redness and visual disturbance  Respiratory: Negative for cough, shortness of breath and wheezing  Cardiovascular: Negative for chest pain, palpitations and leg swelling  Gastrointestinal: Negative for abdominal pain, nausea and vomiting  Endocrine: Negative for polydipsia and polyuria  Genitourinary: Negative for dyspareunia and hematuria  Musculoskeletal: Negative for arthralgias, joint swelling and myalgias  Skin: Negative for rash and wound  Neurological: Negative for dizziness, numbness and headaches  Psychiatric/Behavioral: Negative for decreased concentration and suicidal ideas  The patient is not nervous/anxious  VITALS:  Vitals:    01/14/21 0836   BP: 128/84   Pulse: 71       LABS:  HgA1c: No results found for: HGBA1C  BMP:   Lab Results   Component Value Date    CALCIUM 9 6 10/13/2017    K 3 7 10/13/2017    CO2 29 10/13/2017     10/13/2017    BUN 11 10/13/2017    CREATININE 0 91 10/13/2017       _____________________________________________________  PHYSICAL EXAMINATION:  General: well developed and well nourished, alert, oriented times 3 and appears comfortable  Psychiatric: Normal  HEENT: Normocephalic, Atraumatic Trachea Midline, No torticollis  Pulmonary: No audible wheezing or respiratory distress   Cardiovascular: No pitting edema, 2+ radial pulse   Skin: No Erythema, No Lacerations  Neurovascular: Sensation Intact to the Median, Ulnar, Radial Nerve, Motor Intact to the Median, Ulnar, Radial Nerve and Pulses Intact  Musculoskeletal: Normal, except as noted in detailed exam and in HPI        MUSCULOSKELETAL EXAMINATION:  Right wrist   Skin intact   Mass to the volar radial aspect approximately 2x2 cm in size  Mass is soft and compressible   Mass is non tender to palpation   Negative tinel's at the mass  Full range of motion of wrist, DPC 0  omkar's test demonstrate appropriate fill from both the radial and ulnar artery   5/5 Motor to the APB, FDI, FDP2, FDP5, EDC  Sensation intact to light touch in the median, radial, and ulnar nerve distribution    Brisk capillary refill noted   ___________________________________________________  STUDIES REVIEWED:  I have personally reviewed MRI of the right wrist obtained on 12/28/2020 demonstrates lobulated ganglion cyst located using a volar radial artery with evidence of ulnar positivity syndrome ( patient is asymptomatic on the ulnar side of the wrist)       PROCEDURES PERFORMED:  Procedures  No Procedures performed today    _____________________________________________________      Scribe Attestation    I,:  Mario Faust MA am acting as a scribe while in the presence of the attending physician :       I,:  Dangelo Lazaro MD personally performed the services described in this documentation    as scribed in my presence :

## 2021-01-14 NOTE — H&P (VIEW-ONLY)
ASSESSMENT/PLAN:      53-year-old female with volar radial right ganglion cyst adherent to radial artery  Patient and I discussed that she would benefit from surgical intervention of mass excision  Risks and benefits of surgery were discussed today in the office  We discussed because of the location of the cyst and with it being adherent to the radial artery that there is a chance with surgical excision that there may be injury to the artery  Risk of the surgery are inclusive of but not limited to bleeding, infection, nerve injury, blood clot, worsening of symptoms, not achieving the anticipated results, persistent stiffness, weakness and the need for additional surgery  The patient verbally stated they understood those risks and would like to proceed with the surgery  She will meet with my surgery scheduler today in the office to pick her surgical date and schedule her 1st postoperative visit pulled we discussed full recovery is expected in 6 weeks after surgery  I will see her back 10 days after surgery  Incidental finding of ulnar carpal abutment syndrome on MRI patient is asymptomatic at this time and not want pursue anything for this      I personally obtained a written consent after risks benefits alternatives were discussed in detail with the patient  The patient verbalized understanding of exam findings and treatment plan  We engaged in the shared decision-making process and treatment options were discussed at length with the patient  Surgical and conservative management discussed today along with risks and benefits  Diagnoses and all orders for this visit:    Mass of right wrist    Other orders  -     ergocalciferol (VITAMIN D2) 50,000 units; TAKE 1 CAPSULE(S) EVERY WEEK BY ORAL ROUTE IN THE MORNING FOR 90 DAYS  -     ferrous gluconate (FERGON) 324 mg tablet; TAKE 1 TABLET(S) EVERY DAY BY MOUTH BEFORE MEALS FOR 30 DAYS            Ganglion Cyst Excision: The anatomy and physiology of the ganglion was discussed with the patient today in the office  Fluid leaking out of the joint surface typically creates a small sac, which can enlarge and cause pain or limitation of motion  Treatment options include observation, aspiration, or surgical incision were discussed with the patient today  Observation typically lead to resolution and approximately 10% of patients, aspiration least resolution approximately 50% of patients, and surgical excision lead to resolution in approximately 97% of patients  After discussion with the patient today, the patient voiced understanding of all treatment options  The patient has elected excision of the ganglion  The risks and benefits of the procedure were explained to the patient, which include, but are not limited to: Bleeding, infection, recurrence, pain, scar, damage to tendons, damage to nerves, and damage to blood vessels, failure to give desired results and complications related to anesthesia  These risks, along with alternative conservative treatment options, and postoperative protocols were voiced back and understood by the patient  All questions were answered to the patient's satisfaction  The patient agrees to comply with a standard postoperative protocol, and is willing to proceed  Education was provided via written and auditory forms  There were no barriers to learning  Written handouts regarding wound care, incision and scar care, and general preoperative information was provided to the patient  Prior to surgery, the patient may be requested to stop all anti-inflammatory medications  Prophylactic aspirin, Plavix, and Coumadin may be allowed to be continued  Medications including vitamin E , ginkgo, and fish oil are requested to be stopped approximately one week prior to surgery  Hypertensive medications and beta blockers, if taken, should be continued  Follow Up:  No follow-ups on file        To Do Next Visit:  Re-evaluation of current issue    ____________________________________________________________________________________________________________________________________________      CHIEF COMPLAINT:  No chief complaint on file  SUBJECTIVE:  Ron York is a 39y o  year old RHD female who presents the office today for a follow-up evaluation of her right radial volar wrist mass  Patient was able to obtain MRI prior to today's visit  She states she continues to have the mass present  The mass is not painful to her but does hurt when she bumps it  Patient reports that the mass is more of an annoyance to her and would like to have it removed  She denies any new injuries or trauma  She denies any numbness and tingling  She does note intermittently feeling that the wrist is a bit weak  I have personally reviewed all the relevant PMH, PSH, SH, FH, Medications and allergies       PAST MEDICAL HISTORY:  Past Medical History:   Diagnosis Date    Abnormal Pap smear of cervix     Allergic rhinitis     Dizziness        PAST SURGICAL HISTORY:  Past Surgical History:   Procedure Laterality Date    BREAST EXCISIONAL BIOPSY Left 1994    benign    TUBAL LIGATION         FAMILY HISTORY:  Family History   Problem Relation Age of Onset    Sarcoidosis Father     Breast cancer Sister 39    Breast cancer Maternal Grandmother 79       SOCIAL HISTORY:  Social History     Tobacco Use    Smoking status: Never Smoker    Smokeless tobacco: Never Used   Substance Use Topics    Alcohol use: No    Drug use: No       MEDICATIONS:    Current Outpatient Medications:     ergocalciferol (VITAMIN D2) 50,000 units, TAKE 1 CAPSULE(S) EVERY WEEK BY ORAL ROUTE IN THE MORNING FOR 90 DAYS , Disp: , Rfl:     ferrous gluconate (FERGON) 324 mg tablet, TAKE 1 TABLET(S) EVERY DAY BY MOUTH BEFORE MEALS FOR 30 DAYS , Disp: , Rfl:     levonorgestrel (MIRENA) 20 MCG/24HR IUD, 1 each by Intrauterine route once, Disp: , Rfl:     ALLERGIES:  Allergies   Allergen Reactions    Other      Seasonal    Shellfish-Derived Products        REVIEW OF SYSTEMS:  Review of Systems   Constitutional: Negative for chills, fever and unexpected weight change  HENT: Negative for hearing loss, nosebleeds and sore throat  Eyes: Negative for pain, redness and visual disturbance  Respiratory: Negative for cough, shortness of breath and wheezing  Cardiovascular: Negative for chest pain, palpitations and leg swelling  Gastrointestinal: Negative for abdominal pain, nausea and vomiting  Endocrine: Negative for polydipsia and polyuria  Genitourinary: Negative for dyspareunia and hematuria  Musculoskeletal: Negative for arthralgias, joint swelling and myalgias  Skin: Negative for rash and wound  Neurological: Negative for dizziness, numbness and headaches  Psychiatric/Behavioral: Negative for decreased concentration and suicidal ideas  The patient is not nervous/anxious  VITALS:  Vitals:    01/14/21 0836   BP: 128/84   Pulse: 71       LABS:  HgA1c: No results found for: HGBA1C  BMP:   Lab Results   Component Value Date    CALCIUM 9 6 10/13/2017    K 3 7 10/13/2017    CO2 29 10/13/2017     10/13/2017    BUN 11 10/13/2017    CREATININE 0 91 10/13/2017       _____________________________________________________  PHYSICAL EXAMINATION:  General: well developed and well nourished, alert, oriented times 3 and appears comfortable  Psychiatric: Normal  HEENT: Normocephalic, Atraumatic Trachea Midline, No torticollis  Pulmonary: No audible wheezing or respiratory distress   Cardiovascular: No pitting edema, 2+ radial pulse   Skin: No Erythema, No Lacerations  Neurovascular: Sensation Intact to the Median, Ulnar, Radial Nerve, Motor Intact to the Median, Ulnar, Radial Nerve and Pulses Intact  Musculoskeletal: Normal, except as noted in detailed exam and in HPI        MUSCULOSKELETAL EXAMINATION:  Right wrist   Skin intact   Mass to the volar radial aspect approximately 2x2 cm in size  Mass is soft and compressible   Mass is non tender to palpation   Negative tinel's at the mass  Full range of motion of wrist, DPC 0  omkar's test demonstrate appropriate fill from both the radial and ulnar artery   5/5 Motor to the APB, FDI, FDP2, FDP5, EDC  Sensation intact to light touch in the median, radial, and ulnar nerve distribution    Brisk capillary refill noted   ___________________________________________________  STUDIES REVIEWED:  I have personally reviewed MRI of the right wrist obtained on 12/28/2020 demonstrates lobulated ganglion cyst located using a volar radial artery with evidence of ulnar positivity syndrome ( patient is asymptomatic on the ulnar side of the wrist)       PROCEDURES PERFORMED:  Procedures  No Procedures performed today    _____________________________________________________      Scribe Attestation    I,:  Mak Shirley MA am acting as a scribe while in the presence of the attending physician :       I,:  Sangeeta Ambrocio MD personally performed the services described in this documentation    as scribed in my presence :

## 2021-01-14 NOTE — PROGRESS NOTES
ASSESSMENT/PLAN:      51-year-old female with volar radial right ganglion cyst adherent to radial artery  Patient and I discussed that she would benefit from surgical intervention of mass excision  Risks and benefits of surgery were discussed today in the office  We discussed because of the location of the cyst and with it being adherent to the radial artery that there is a chance with surgical excision that there may be injury to the artery  Risk of the surgery are inclusive of but not limited to bleeding, infection, nerve injury, blood clot, worsening of symptoms, not achieving the anticipated results, persistent stiffness, weakness and the need for additional surgery  The patient verbally stated they understood those risks and would like to proceed with the surgery  She will meet with my surgery scheduler today in the office to pick her surgical date and schedule her 1st postoperative visit pulled we discussed full recovery is expected in 6 weeks after surgery  I will see her back 10 days after surgery  Incidental finding of ulnar carpal abutment syndrome on MRI patient is asymptomatic at this time and not want pursue anything for this      I personally obtained a written consent after risks benefits alternatives were discussed in detail with the patient  The patient verbalized understanding of exam findings and treatment plan  We engaged in the shared decision-making process and treatment options were discussed at length with the patient  Surgical and conservative management discussed today along with risks and benefits  Diagnoses and all orders for this visit:    Mass of right wrist    Other orders  -     ergocalciferol (VITAMIN D2) 50,000 units; TAKE 1 CAPSULE(S) EVERY WEEK BY ORAL ROUTE IN THE MORNING FOR 90 DAYS  -     ferrous gluconate (FERGON) 324 mg tablet; TAKE 1 TABLET(S) EVERY DAY BY MOUTH BEFORE MEALS FOR 30 DAYS            Ganglion Cyst Excision: The anatomy and physiology of the ganglion was discussed with the patient today in the office  Fluid leaking out of the joint surface typically creates a small sac, which can enlarge and cause pain or limitation of motion  Treatment options include observation, aspiration, or surgical incision were discussed with the patient today  Observation typically lead to resolution and approximately 10% of patients, aspiration least resolution approximately 50% of patients, and surgical excision lead to resolution in approximately 97% of patients  After discussion with the patient today, the patient voiced understanding of all treatment options  The patient has elected excision of the ganglion  The risks and benefits of the procedure were explained to the patient, which include, but are not limited to: Bleeding, infection, recurrence, pain, scar, damage to tendons, damage to nerves, and damage to blood vessels, failure to give desired results and complications related to anesthesia  These risks, along with alternative conservative treatment options, and postoperative protocols were voiced back and understood by the patient  All questions were answered to the patient's satisfaction  The patient agrees to comply with a standard postoperative protocol, and is willing to proceed  Education was provided via written and auditory forms  There were no barriers to learning  Written handouts regarding wound care, incision and scar care, and general preoperative information was provided to the patient  Prior to surgery, the patient may be requested to stop all anti-inflammatory medications  Prophylactic aspirin, Plavix, and Coumadin may be allowed to be continued  Medications including vitamin E , ginkgo, and fish oil are requested to be stopped approximately one week prior to surgery  Hypertensive medications and beta blockers, if taken, should be continued  Follow Up:  No follow-ups on file        To Do Next Visit:  Re-evaluation of current issue    ____________________________________________________________________________________________________________________________________________      CHIEF COMPLAINT:  No chief complaint on file  SUBJECTIVE:  Glenda Cotto is a 39y o  year old RHD female who presents the office today for a follow-up evaluation of her right radial volar wrist mass  Patient was able to obtain MRI prior to today's visit  She states she continues to have the mass present  The mass is not painful to her but does hurt when she bumps it  Patient reports that the mass is more of an annoyance to her and would like to have it removed  She denies any new injuries or trauma  She denies any numbness and tingling  She does note intermittently feeling that the wrist is a bit weak  I have personally reviewed all the relevant PMH, PSH, SH, FH, Medications and allergies       PAST MEDICAL HISTORY:  Past Medical History:   Diagnosis Date    Abnormal Pap smear of cervix     Allergic rhinitis     Dizziness        PAST SURGICAL HISTORY:  Past Surgical History:   Procedure Laterality Date    BREAST EXCISIONAL BIOPSY Left 1994    benign    TUBAL LIGATION         FAMILY HISTORY:  Family History   Problem Relation Age of Onset    Sarcoidosis Father     Breast cancer Sister 39    Breast cancer Maternal Grandmother 79       SOCIAL HISTORY:  Social History     Tobacco Use    Smoking status: Never Smoker    Smokeless tobacco: Never Used   Substance Use Topics    Alcohol use: No    Drug use: No       MEDICATIONS:    Current Outpatient Medications:     ergocalciferol (VITAMIN D2) 50,000 units, TAKE 1 CAPSULE(S) EVERY WEEK BY ORAL ROUTE IN THE MORNING FOR 90 DAYS , Disp: , Rfl:     ferrous gluconate (FERGON) 324 mg tablet, TAKE 1 TABLET(S) EVERY DAY BY MOUTH BEFORE MEALS FOR 30 DAYS , Disp: , Rfl:     levonorgestrel (MIRENA) 20 MCG/24HR IUD, 1 each by Intrauterine route once, Disp: , Rfl:     ALLERGIES:  Allergies   Allergen Reactions    Other      Seasonal    Shellfish-Derived Products        REVIEW OF SYSTEMS:  Review of Systems   Constitutional: Negative for chills, fever and unexpected weight change  HENT: Negative for hearing loss, nosebleeds and sore throat  Eyes: Negative for pain, redness and visual disturbance  Respiratory: Negative for cough, shortness of breath and wheezing  Cardiovascular: Negative for chest pain, palpitations and leg swelling  Gastrointestinal: Negative for abdominal pain, nausea and vomiting  Endocrine: Negative for polydipsia and polyuria  Genitourinary: Negative for dyspareunia and hematuria  Musculoskeletal: Negative for arthralgias, joint swelling and myalgias  Skin: Negative for rash and wound  Neurological: Negative for dizziness, numbness and headaches  Psychiatric/Behavioral: Negative for decreased concentration and suicidal ideas  The patient is not nervous/anxious  VITALS:  Vitals:    01/14/21 0836   BP: 128/84   Pulse: 71       LABS:  HgA1c: No results found for: HGBA1C  BMP:   Lab Results   Component Value Date    CALCIUM 9 6 10/13/2017    K 3 7 10/13/2017    CO2 29 10/13/2017     10/13/2017    BUN 11 10/13/2017    CREATININE 0 91 10/13/2017       _____________________________________________________  PHYSICAL EXAMINATION:  General: well developed and well nourished, alert, oriented times 3 and appears comfortable  Psychiatric: Normal  HEENT: Normocephalic, Atraumatic Trachea Midline, No torticollis  Pulmonary: No audible wheezing or respiratory distress   Cardiovascular: No pitting edema, 2+ radial pulse   Skin: No Erythema, No Lacerations  Neurovascular: Sensation Intact to the Median, Ulnar, Radial Nerve, Motor Intact to the Median, Ulnar, Radial Nerve and Pulses Intact  Musculoskeletal: Normal, except as noted in detailed exam and in HPI        MUSCULOSKELETAL EXAMINATION:  Right wrist   Skin intact   Mass to the volar radial aspect approximately 2x2 cm in size  Mass is soft and compressible   Mass is non tender to palpation   Negative tinel's at the mass  Full range of motion of wrist, DPC 0  omkar's test demonstrate appropriate fill from both the radial and ulnar artery   5/5 Motor to the APB, FDI, FDP2, FDP5, EDC  Sensation intact to light touch in the median, radial, and ulnar nerve distribution    Brisk capillary refill noted   ___________________________________________________  STUDIES REVIEWED:  I have personally reviewed MRI of the right wrist obtained on 12/28/2020 demonstrates lobulated ganglion cyst located using a volar radial artery with evidence of ulnar positivity syndrome ( patient is asymptomatic on the ulnar side of the wrist)       PROCEDURES PERFORMED:  Procedures  No Procedures performed today    _____________________________________________________      Scribe Attestation    I,:  Lauren Huitron MA am acting as a scribe while in the presence of the attending physician :       I,:  Rena Guevara MD personally performed the services described in this documentation    as scribed in my presence :

## 2021-01-25 ENCOUNTER — ANNUAL EXAM (OUTPATIENT)
Dept: OBGYN CLINIC | Facility: CLINIC | Age: 46
End: 2021-01-25
Payer: COMMERCIAL

## 2021-01-25 VITALS
DIASTOLIC BLOOD PRESSURE: 88 MMHG | BODY MASS INDEX: 30.89 KG/M2 | SYSTOLIC BLOOD PRESSURE: 132 MMHG | HEIGHT: 68 IN | WEIGHT: 203.8 LBS

## 2021-01-25 DIAGNOSIS — R92.8 ABNORMAL MAMMOGRAM: ICD-10-CM

## 2021-01-25 DIAGNOSIS — Z30.431 IUD CHECK UP: ICD-10-CM

## 2021-01-25 DIAGNOSIS — Z01.419 ENCOUNTER FOR GYNECOLOGICAL EXAMINATION WITHOUT ABNORMAL FINDING: Primary | ICD-10-CM

## 2021-01-25 PROCEDURE — 99396 PREV VISIT EST AGE 40-64: CPT | Performed by: OBSTETRICS & GYNECOLOGY

## 2021-01-25 NOTE — PROGRESS NOTES
Assessment/Plan:    1  Abnormal mammogram    - Mammo diagnostic bilateral w 3d & cad; Future    2  IUD check up      3  Encounter for gynecological examination without abnormal finding          Subjective      Niharika Gordon is a 39 y o  female who presents for annual exam  Periods have been absent since 2020 due to the [place] IUD  Dysmenorrhea:mild, occurring premenstrually  Cyclic symptoms include none  No intermenstrual bleeding, spotting, or discharge  The patient denies any urinary or sexual issues  Current contraception: IUD  History of abnormal Pap smear: no  Regular self breast exam: yes  History of abnormal mammogram: yes - f/u views were due 2020  History of abnormal lipids: no    Menstrual History:  OB History        4    Para   3    Term   3            AB   1    Living   3       SAB        TAB        Ectopic        Multiple        Live Births   2                Patient's last menstrual period was 2020 (within days)  The following portions of the patient's history were reviewed and updated as appropriate: allergies, current medications, past family history, past medical history, past social history, past surgical history and problem list     Review of Systems  Pertinent items are noted in HPI  Objective      /88 (BP Location: Right arm, Patient Position: Sitting, Cuff Size: Large)   Ht 5' 8 26" (1 734 m)   Wt 92 4 kg (203 lb 12 8 oz)   LMP 2020 (Within Days)   BMI 30 75 kg/m²     General:   alert and oriented, in no acute distress   Heart:    Breasts: regular rate and rhythm, S1, S2 normal, no murmur, click, rub or gallop   appear normal, no suspicious masses, no skin or nipple changes or axillary nodes     Lungs: clear to auscultation bilaterally   Abdomen: soft, non-tender, without masses or organomegaly   Vulva: normal   Vagina: normal mucosa   Cervix: no lesions and IUD strings seen   Uterus: normal size, mobile, non-tender   Adnexa: normal adnexa and no mass, fullness, tenderness

## 2021-02-04 ENCOUNTER — ANESTHESIA EVENT (OUTPATIENT)
Dept: PERIOP | Facility: HOSPITAL | Age: 46
End: 2021-02-04
Payer: COMMERCIAL

## 2021-02-04 NOTE — PRE-PROCEDURE INSTRUCTIONS
Pre-Surgery Instructions:   Medication Instructions    ergocalciferol (VITAMIN D2) 50,000 units Instructed patient per Anesthesia Guidelines   ferrous gluconate (FERGON) 324 mg tablet Instructed patient per Anesthesia Guidelines   levonorgestrel (MIRENA) 20 MCG/24HR IUD Instructed patient per Anesthesia Guidelines  All pre-op instructions provided per [facility] patient education materials  Inst NPO post MN, Inst to hold aspirin, nsaids, otc vit/supp until after sx as directed by dr Martin Patch showering instructions x 2 w/ CHG reviewed  Enc to wear comfortable clothing, & loose fitting shirt for postop use  Verb understanding to all instructions provided  States has no further concerns at this time

## 2021-02-05 ENCOUNTER — HOSPITAL ENCOUNTER (OUTPATIENT)
Facility: HOSPITAL | Age: 46
Setting detail: OUTPATIENT SURGERY
Discharge: HOME | End: 2021-02-05
Attending: SURGERY | Admitting: SURGERY
Payer: COMMERCIAL

## 2021-02-05 ENCOUNTER — ANESTHESIA (OUTPATIENT)
Dept: PERIOP | Facility: HOSPITAL | Age: 46
End: 2021-02-05
Payer: COMMERCIAL

## 2021-02-05 VITALS
HEIGHT: 68 IN | TEMPERATURE: 97.6 F | HEART RATE: 73 BPM | DIASTOLIC BLOOD PRESSURE: 90 MMHG | BODY MASS INDEX: 30.77 KG/M2 | WEIGHT: 203 LBS | OXYGEN SATURATION: 98 % | RESPIRATION RATE: 16 BRPM | SYSTOLIC BLOOD PRESSURE: 148 MMHG

## 2021-02-05 VITALS — HEART RATE: 56 BPM

## 2021-02-05 DIAGNOSIS — Z47.89 AFTERCARE FOLLOWING SURGERY OF THE MUSCULOSKELETAL SYSTEM: ICD-10-CM

## 2021-02-05 DIAGNOSIS — R22.31 MASS OF RIGHT WRIST: Primary | ICD-10-CM

## 2021-02-05 LAB
EXT PREGNANCY TEST URINE: NEGATIVE
EXT. CONTROL: NORMAL

## 2021-02-05 PROCEDURE — 88304 TISSUE EXAM BY PATHOLOGIST: CPT | Performed by: PATHOLOGY

## 2021-02-05 PROCEDURE — 25111 REMOVE WRIST TENDON LESION: CPT | Performed by: SURGERY

## 2021-02-05 PROCEDURE — 81025 URINE PREGNANCY TEST: CPT | Performed by: SURGERY

## 2021-02-05 RX ORDER — SODIUM CHLORIDE, SODIUM LACTATE, POTASSIUM CHLORIDE, CALCIUM CHLORIDE 600; 310; 30; 20 MG/100ML; MG/100ML; MG/100ML; MG/100ML
125 INJECTION, SOLUTION INTRAVENOUS CONTINUOUS
Status: DISCONTINUED | OUTPATIENT
Start: 2021-02-05 | End: 2021-02-05 | Stop reason: HOSPADM

## 2021-02-05 RX ORDER — SODIUM CHLORIDE, SODIUM LACTATE, POTASSIUM CHLORIDE, CALCIUM CHLORIDE 600; 310; 30; 20 MG/100ML; MG/100ML; MG/100ML; MG/100ML
75 INJECTION, SOLUTION INTRAVENOUS CONTINUOUS
Status: DISCONTINUED | OUTPATIENT
Start: 2021-02-05 | End: 2021-02-05 | Stop reason: HOSPADM

## 2021-02-05 RX ORDER — CHLORHEXIDINE GLUCONATE 0.12 MG/ML
15 RINSE ORAL ONCE
Status: DISCONTINUED | OUTPATIENT
Start: 2021-02-05 | End: 2021-02-05

## 2021-02-05 RX ORDER — KETOROLAC TROMETHAMINE 30 MG/ML
INJECTION, SOLUTION INTRAMUSCULAR; INTRAVENOUS AS NEEDED
Status: DISCONTINUED | OUTPATIENT
Start: 2021-02-05 | End: 2021-02-05

## 2021-02-05 RX ORDER — ONDANSETRON 2 MG/ML
INJECTION INTRAMUSCULAR; INTRAVENOUS AS NEEDED
Status: DISCONTINUED | OUTPATIENT
Start: 2021-02-05 | End: 2021-02-05

## 2021-02-05 RX ORDER — CEFAZOLIN SODIUM 2 G/50ML
2000 SOLUTION INTRAVENOUS ONCE
Status: COMPLETED | OUTPATIENT
Start: 2021-02-05 | End: 2021-02-05

## 2021-02-05 RX ORDER — HYDROCODONE BITARTRATE AND ACETAMINOPHEN 5; 325 MG/1; MG/1
1 TABLET ORAL EVERY 6 HOURS PRN
Qty: 12 TABLET | Refills: 0 | Status: SHIPPED | OUTPATIENT
Start: 2021-02-05 | End: 2021-02-15

## 2021-02-05 RX ORDER — LIDOCAINE HYDROCHLORIDE 10 MG/ML
0.5 INJECTION, SOLUTION EPIDURAL; INFILTRATION; INTRACAUDAL; PERINEURAL ONCE AS NEEDED
Status: COMPLETED | OUTPATIENT
Start: 2021-02-05 | End: 2021-02-05

## 2021-02-05 RX ORDER — HYDROCODONE BITARTRATE AND ACETAMINOPHEN 5; 325 MG/1; MG/1
1 TABLET ORAL EVERY 6 HOURS PRN
Status: DISCONTINUED | OUTPATIENT
Start: 2021-02-05 | End: 2021-02-05 | Stop reason: HOSPADM

## 2021-02-05 RX ORDER — DEXAMETHASONE SODIUM PHOSPHATE 10 MG/ML
INJECTION, SOLUTION INTRAMUSCULAR; INTRAVENOUS AS NEEDED
Status: DISCONTINUED | OUTPATIENT
Start: 2021-02-05 | End: 2021-02-05

## 2021-02-05 RX ORDER — LIDOCAINE HYDROCHLORIDE 10 MG/ML
INJECTION, SOLUTION EPIDURAL; INFILTRATION; INTRACAUDAL; PERINEURAL AS NEEDED
Status: DISCONTINUED | OUTPATIENT
Start: 2021-02-05 | End: 2021-02-05

## 2021-02-05 RX ORDER — FENTANYL CITRATE 50 UG/ML
INJECTION, SOLUTION INTRAMUSCULAR; INTRAVENOUS AS NEEDED
Status: DISCONTINUED | OUTPATIENT
Start: 2021-02-05 | End: 2021-02-05

## 2021-02-05 RX ORDER — FENTANYL CITRATE/PF 50 MCG/ML
25 SYRINGE (ML) INJECTION
Status: DISCONTINUED | OUTPATIENT
Start: 2021-02-05 | End: 2021-02-05 | Stop reason: HOSPADM

## 2021-02-05 RX ORDER — ONDANSETRON 2 MG/ML
4 INJECTION INTRAMUSCULAR; INTRAVENOUS ONCE AS NEEDED
Status: DISCONTINUED | OUTPATIENT
Start: 2021-02-05 | End: 2021-02-05 | Stop reason: HOSPADM

## 2021-02-05 RX ADMIN — DEXAMETHASONE SODIUM PHOSPHATE 5 MG: 10 INJECTION, SOLUTION INTRAMUSCULAR; INTRAVENOUS at 12:25

## 2021-02-05 RX ADMIN — LIDOCAINE HYDROCHLORIDE 5 ML: 10 INJECTION, SOLUTION EPIDURAL; INFILTRATION; INTRACAUDAL; PERINEURAL at 12:12

## 2021-02-05 RX ADMIN — FENTANYL CITRATE 50 MCG: 50 INJECTION INTRAMUSCULAR; INTRAVENOUS at 12:18

## 2021-02-05 RX ADMIN — ONDANSETRON 4 MG: 2 INJECTION INTRAMUSCULAR; INTRAVENOUS at 12:25

## 2021-02-05 RX ADMIN — HYDROCODONE BITARTRATE AND ACETAMINOPHEN 1 TABLET: 5; 325 TABLET ORAL at 14:15

## 2021-02-05 RX ADMIN — KETOROLAC TROMETHAMINE 30 MG: 30 INJECTION, SOLUTION INTRAMUSCULAR at 12:45

## 2021-02-05 RX ADMIN — SODIUM CHLORIDE, SODIUM LACTATE, POTASSIUM CHLORIDE, AND CALCIUM CHLORIDE 125 ML/HR: .6; .31; .03; .02 INJECTION, SOLUTION INTRAVENOUS at 10:48

## 2021-02-05 RX ADMIN — LIDOCAINE HYDROCHLORIDE 0.5 ML: 10 INJECTION, SOLUTION EPIDURAL; INFILTRATION; INTRACAUDAL; PERINEURAL at 10:48

## 2021-02-05 RX ADMIN — CEFAZOLIN SODIUM 2000 MG: 2 SOLUTION INTRAVENOUS at 12:16

## 2021-02-05 NOTE — DISCHARGE INSTRUCTIONS
Post Operative Instructions    You have had surgery on your arm today, please read and follow the information below:  · Elevate your hand above your elbow during the next 24-48 hours to help with swelling  · Place your hand and arm over your head with motion at your shoulder three times a day  · Do not apply any cream/ointment/oil to your incisions including antibiotics  · Do not soak your hands in standing water (dishwater, tubs, Jacuzzi's, pools, etc ) until given permission (typically 2-3 weeks after injury)    Call the office if you notice any:  · Increased numbness or tingling of your hand or fingers that is not relieved with elevation  · Increasing pain that is not controlled with medication  · Difficulty chewing, breathing, swallowing  · Chest pains or shortness of breath  · Fever over 101 4 degrees  Bandage: Please keep bandages clean and dry  Remove bandage after 7 days  Once bandages are removed you may wash hand with soap and water and take short showers, but please avoid soaking the hand as described above  Any steri strips and suture tails will be removed in office or fall off on their own  Please do NOT put any topical agents on the surgical wound including neosporin, peroxide, tea tree oil, vitamin E, etc  as these can delay wound healing  Motion: Move fingers into a fist 5 times a day, DO NOT move any splinted fingers  Weight bearing status: Avoid heavy lifting (>5 pounds) with the extremity that was operated on until follow up appointment  Normal activities of daily living are OK  Ice: Ice for 10 minutes every hour as needed for swelling x 24 hours  Sling: No sling necessary      Pain medication:   Naproxen 220 mg two time a day (do not take this medication if you were told by your doctor that you cannot take anti-inflammatories or NSAIDS)  Tylenol Extended Release 650 mg every 8 hours  Norco/Hydrocodone one tab every 6 hours ONLY AS NEEDED for severe pain         Follow-up Appointment: 10-14 days with Dr Allison Garay        Please call the office if you have any questions or concerns regarding your post-operative care

## 2021-02-05 NOTE — ANESTHESIA PREPROCEDURE EVALUATION
Procedure:  EXCISION GANGLION CYST (Right Finger)    Relevant Problems   ANESTHESIA (within normal limits)      CARDIO (within normal limits)      GYN   (-) Currently pregnant      PULMONARY (within normal limits)   (-) Sleep apnea   (-) Smoking   (-) URI (upper respiratory infection)      Other   (+) Mass of right wrist   (+) Vertigo    BMI 30    Physical Exam    Airway    Mallampati score: II  TM Distance: >3 FB  Neck ROM: full     Dental   No notable dental hx     Cardiovascular      Pulmonary      Other Findings       No recent labs    Anesthesia Plan  ASA Score- 1     Anesthesia Type- general with ASA Monitors  Additional Monitors:   Airway Plan: LMA  Comment: Discussed GA/LMA vs nerve block/MAC - pt elects GA  Plan Factors-Exercise tolerance (METS): >4 METS  Chart reviewed  Existing labs reviewed  Patient summary reviewed  Patient is not a current smoker  Induction- intravenous  Postoperative Plan-     Informed Consent- Anesthetic plan and risks discussed with patient and spouse  I personally reviewed this patient with the CRNA  Discussed and agreed on the Anesthesia Plan with the CRNA  Agnieszka Adams

## 2021-02-05 NOTE — ANESTHESIA POSTPROCEDURE EVALUATION
Post-Op Assessment Note    CV Status:  Stable    Pain management: adequate     Mental Status:  Alert and awake   Hydration Status:  Euvolemic   PONV Controlled:  Controlled   Airway Patency:  Patent      Post Op Vitals Reviewed: Yes      Staff: CRNA         No complications documented      BP   147/87   Temp  97 2   Pulse  66   Resp   16   SpO2   96%

## 2021-02-05 NOTE — OP NOTE
OPERATIVE REPORT  PATIENT NAME: Abdoul Ayala  :  1975  MRN: [id number]  Pt Location: [facility]    SURGERY DATE: 21    Surgeon(s) and Role:     * Bayron Aleman MD - Primary     * Marcos Gilbert MD - Assisting     * Alea Mason PA-C - Observing    Pre-Op Diagnosis:  Mass of right wrist [R22 31]    Post-Op Diagnosis Codes:     * Mass of right wrist [R22 31]    Procedure(s):  EXCISION GANGLION CYST (Right)  ARTHROTOMY RADIAL CARPAL TUNNEL JOINT    Specimen(s):  Order Name Source Comment Collection Info Order Time   TISSUE EXAM Ganglion Cyst  Collected By: Bayron Aleman MD 2021 12:39 PM     Release to patient through Mychart   Immediate            Estimated Blood Loss:   Minimal    Anesthesia Type:   Regional with Sedation    IMPLANTS:  * No implants in log *    PERIOPERATIVE ANTIBIOTICS:    cefazolin, 2 grams    Tourniquet Time: 13 min at 250 mmHg          Operative Indications: The patient has a history of Volar ganglion cyst  right that was recalcitrant to conservative management  The decision was made to bring the patient to the operating room for Volar ganglion cyst excision  right  Risks of the procedure were explained which include, but are not limited to bleeding; infection; damage to nerves, arteries,veins, tendons; scar; pain; need for reoperation; failure to give desired result; and risks of anaesthesia  All questions were answered to satisfaction and they were willing to proceed  Operative Findings:  Volar ganglion cyst closely associated with the radial artery extending into the radio scaphoid articulation    Complications:   None    Procedure and Technique:  After the patient, site, and procedure were identified, the patient was brought into the operating room in a supine position  General anaesthesia and local medication were provided  A well padded tourniquet was applied to the extremity, set at 250 mmHg    The  right upper extremity was then prepped and drapped in a normal, sterile, orthopedic fashion  A hockey stick type of incision as made over the radial aspect of the volar wrist  The incision was made just proximal to the distal wrist crease and extending proximally, centered over the volar wrist mass  Blunt dissection was taken down to the level of the cyst  The cyst was identified and was freed from surrounding tissue  The cyst was simple, and was intimately adherent to the radial vasculature  The radial artery was dissected free from the cyst where possible and where it could not, a portion of the cyst wall was left attached to the artery  The stalk was identified and was traced to its origin from the  Radioscaphoid articulation  The stalk was transected along with a small window of the capsule  The cyst was passed off the table  At the completion of the procedure, hemostasis was obtained with cautery and direct pressure  The wounds were copiously irrigated with sterile solution  The wounds were closed with Monocryl  Sterile dressings were applied, including Xeroform, gauze, tweeners, webril, ACE and Volar Splint  Please note, all sponge, needle, and instrument counts were correct prior to closure  Loupe magnification was utilized  The patient tolerated the procedure well       I was present for all critical portions of the procedure    Patient Disposition:  PACU     SIGNATURE: Ronald Monroy MD  DATE: 02/05/21  TIME: 1:05 PM

## 2021-02-05 NOTE — INTERVAL H&P NOTE
H&P reviewed  After examining the patient I find no changes in the patients condition since the H&P had been written      Vitals:    02/05/21 1043   BP: 142/88   Pulse: 81   Resp: 16   Temp: (!) 96 6 °F (35 9 °C)   SpO2: 99%

## 2021-02-17 ENCOUNTER — OFFICE VISIT (OUTPATIENT)
Dept: OBGYN CLINIC | Facility: CLINIC | Age: 46
End: 2021-02-17

## 2021-02-17 VITALS
DIASTOLIC BLOOD PRESSURE: 94 MMHG | BODY MASS INDEX: 30.77 KG/M2 | HEART RATE: 81 BPM | SYSTOLIC BLOOD PRESSURE: 139 MMHG | HEIGHT: 68 IN | WEIGHT: 203 LBS

## 2021-02-17 DIAGNOSIS — M67.431 GANGLION CYST OF VOLAR ASPECT OF RIGHT WRIST: Primary | ICD-10-CM

## 2021-02-17 PROCEDURE — 99024 POSTOP FOLLOW-UP VISIT: CPT | Performed by: SURGERY

## 2021-02-17 NOTE — PROGRESS NOTES
Assessment/Plan:  Patient ID: Jhon Tom 39 y o  female   Surgery: Excision Ganglion Cyst - Right and Arthrotomy Radial Carpal Tunnel Joint  Date of Surgery: 2/5/2021    S/p right volar ganglion cyst removal   Wound is healing well, avoid soaking for one more week, then may begin scar massage  Wrist ROM demonstrated in office today  Pathology reviewed  Discussed that these can return, if this is the case we would be happy to see her back in the office  Follow up as needed    Follow Up:  PRN    To Do Next Visit:         CHIEF COMPLAINT:  Chief Complaint   Patient presents with    Right Wrist - Post-op         SUBJECTIVE:  Jhon Tom is a 39y o  year old female who presents for follow up after Excision Ganglion Cyst - Right and Arthrotomy Radial Carpal Tunnel Joint  Today patient has some soreness in the area of the wound but is overall doing well and denies significant pain  No N/T, no fever or chills  PHYSICAL EXAMINATION:  General: well developed and well nourished, alert, oriented times 3 and appears comfortable  Psychiatric: Normal    MUSCULOSKELETAL EXAMINATION:  Incision: Clean, dry, intact  Surgery Site: normal, no evidence of infection   Range of Motion: As expected  Neurovascular status: Neuro intact, good cap refill  Activity Restrictions: No restrictions  Done today: Sutures out      STUDIES REVIEWED:  Pathology reviewed:   Mass consistent with a ganglion cyst       PROCEDURES PERFORMED:  Procedures  No Procedures performed today      Yaniv Srivastava PA-C

## 2021-03-01 ENCOUNTER — TELEPHONE (OUTPATIENT)
Dept: OBGYN CLINIC | Facility: HOSPITAL | Age: 46
End: 2021-03-01

## 2021-03-01 NOTE — TELEPHONE ENCOUNTER
Tex Henson is calling with some concerns in reference to her sutures  Patient had surgery on 02-05-21, and had a post op visit on 02-17-21       Please call Tex Henson at [phone]

## 2021-03-08 ENCOUNTER — HOSPITAL ENCOUNTER (OUTPATIENT)
Dept: MAMMOGRAPHY | Facility: CLINIC | Age: 46
Discharge: HOME | End: 2021-03-08
Payer: COMMERCIAL

## 2021-03-08 VITALS — WEIGHT: 203 LBS | HEIGHT: 69 IN | BODY MASS INDEX: 30.07 KG/M2

## 2021-03-08 DIAGNOSIS — R92.8 ABNORMAL MAMMOGRAM: ICD-10-CM

## 2021-03-08 PROCEDURE — 77066 DX MAMMO INCL CAD BI: CPT

## 2021-03-08 PROCEDURE — G0279 TOMOSYNTHESIS, MAMMO: HCPCS

## 2021-03-30 ENCOUNTER — HOSPITAL ENCOUNTER (EMERGENCY)
Facility: HOSPITAL | Age: 46
Discharge: HOME | End: 2021-03-30
Attending: EMERGENCY MEDICINE
Payer: COMMERCIAL

## 2021-03-30 VITALS
OXYGEN SATURATION: 100 % | RESPIRATION RATE: 16 BRPM | TEMPERATURE: 98 F | DIASTOLIC BLOOD PRESSURE: 80 MMHG | HEART RATE: 65 BPM | SYSTOLIC BLOOD PRESSURE: 169 MMHG

## 2021-03-30 DIAGNOSIS — R22.0 JAW SWELLING: Primary | ICD-10-CM

## 2021-03-30 DIAGNOSIS — K11.20 SIALADENITIS: ICD-10-CM

## 2021-03-30 PROCEDURE — 99282 EMERGENCY DEPT VISIT SF MDM: CPT | Performed by: PHYSICIAN ASSISTANT

## 2021-03-30 PROCEDURE — 99283 EMERGENCY DEPT VISIT LOW MDM: CPT

## 2021-03-30 NOTE — ED PROVIDER NOTES
History  Chief Complaint   Patient presents with    Jaw Swelling     pt saws she was eating a sour pickle and she got swelling behind and below r ear  The patient is a 44-year-old female who presents to the emergency department for evaluation of swelling beneath her right jawline  The patient states that she was eating a sandwich today, and she ate a sour pickle  She states that it tasted somewhat bitter to her, however she took another bite  She reports that soon after that, she began having swelling below her right jawline  She states it is tender to the touch, however she is not otherwise having any pain  She does state that she noticed a small cut in her mouth yesterday from her retainer but she denies any dental pain or ear pain  She states since this occurred, the swelling has already started to go down on its own, however she wanted to be evaluated  She denies any fever, chills or further symptoms at this time  History provided by:  Patient   used: No        Prior to Admission Medications   Prescriptions Last Dose Informant Patient Reported? Taking?   ergocalciferol (VITAMIN D2) 50,000 units  Self Yes No   Sig: TAKE 1 CAPSULE(S) EVERY WEEK BY ORAL ROUTE IN THE MORNING FOR 90 DAYS     ferrous gluconate (FERGON) 324 mg tablet  Self Yes No   Sig: TAKE 1 TABLET(S) EVERY DAY BY MOUTH BEFORE MEALS FOR 30 DAYS    levonorgestrel (MIRENA) 20 MCG/24HR IUD  Self Yes No   Si each by Intrauterine route once      Facility-Administered Medications: None       Past Medical History:   Diagnosis Date    Abnormal Pap smear of cervix     Allergic rhinitis     Anemia     Dizziness     Seasonal allergies     Vertigo        Past Surgical History:   Procedure Laterality Date    ARTHROTOMY ANKLE  2021    Procedure: ARTHROTOMY RADIAL CARPAL TUNNEL JOINT;  Surgeon: Prasanth Farias MD;  [facility];  Service: Orthopedics    BREAST EXCISIONAL BIOPSY Left     benign    [place] EXCIS PRIMARY GANGLION WRIST Right 2/5/2021    Procedure: EXCISION GANGLION CYST;  Surgeon: Balbina Witt MD;  [facility];  Service: Orthopedics    TUBAL LIGATION         Family History   Problem Relation Age of Onset    No Known Problems Mother     Sarcoidosis Father     Breast cancer Sister 39    Breast cancer Maternal Grandmother 79     I have reviewed and agree with the history as documented  E-Cigarette/Vaping    E-Cigarette Use Never User      E-Cigarette/Vaping Substances    Nicotine No     THC No     CBD No     Flavoring No     Other No     Unknown No      Social History     Tobacco Use    Smoking status: Never Smoker    Smokeless tobacco: Never Used   Substance Use Topics    Alcohol use: No    Drug use: No       Review of Systems   Constitutional: Negative for chills and fever  HENT: Positive for facial swelling  Negative for ear pain and sore throat  Eyes: Negative for redness and visual disturbance  Respiratory: Negative for cough and shortness of breath  Cardiovascular: Negative for chest pain  Gastrointestinal: Negative for abdominal pain, diarrhea, nausea and vomiting  Genitourinary: Negative for dysuria and hematuria  Musculoskeletal: Negative for back pain, neck pain and neck stiffness  Skin: Negative for color change and rash  Neurological: Negative for dizziness, light-headedness and headaches  All other systems reviewed and are negative  Physical Exam  Physical Exam  Constitutional:       Appearance: Normal appearance  HENT:      Head: Normocephalic and atraumatic  Right Ear: Tympanic membrane normal       Left Ear: Tympanic membrane normal       Nose: Nose normal       Mouth/Throat:      Mouth: Mucous membranes are moist       Dentition: Normal dentition  No dental tenderness, gingival swelling, dental abscesses or gum lesions  Pharynx: Oropharynx is clear  No posterior oropharyngeal erythema     Eyes: Conjunctiva/sclera: Conjunctivae normal    Neck:      Musculoskeletal: Normal range of motion and neck supple  Pulmonary:      Effort: Pulmonary effort is normal  No respiratory distress  Musculoskeletal: Normal range of motion  Lymphadenopathy:      Cervical: No cervical adenopathy  Skin:     General: Skin is warm and dry  Neurological:      General: No focal deficit present  Mental Status: She is alert and oriented to person, place, and time  Vital Signs  ED Triage Vitals   Temperature Pulse Respirations Blood Pressure SpO2   03/30/21 1508 03/30/21 1507 03/30/21 1507 03/30/21 1507 03/30/21 1507   98 °F (36 7 °C) 65 16 169/80 100 %      Temp Source Heart Rate Source Patient Position - Orthostatic VS BP Location FiO2 (%)   03/30/21 1508 03/30/21 1507 03/30/21 1507 03/30/21 1507 --   Temporal Monitor Sitting Left arm       Pain Score       --                  Vitals:    03/30/21 1507   BP: 169/80   Pulse: 65   Patient Position - Orthostatic VS: Sitting         Visual Acuity      ED Medications  Medications - No data to display    Diagnostic Studies  Results Reviewed     None                 No orders to display              Procedures  Procedures         ED Course                                           MDM  Number of Diagnoses or Management Options  Jaw swelling: new and requires workup  Sialadenitis: new and requires workup  Diagnosis management comments: Patient presents for evaluation of sudden-onset swelling below right jawline after eating sour pickles  Differential includes but is not limited to sialadenitis versus abscess versus otitis media versus dental infection versus lymphadenopathy  I have low suspicion for infectious sialadenitis or other infectious process at this time  Patient was advised to use warm compresses, gargle warm salt water and eat sour candies over the next couple of days  I advised her to monitor swelling    It does not improve within 2 days, I advised further follow-up with her primary care doctor  I advised if she develops fever, significantly worsening swelling or redness over the area, she should return to the emergency department for further evaluation  Sialadenitis remains highest on my differential at this time  Patient is stable for discharge  Amount and/or Complexity of Data Reviewed  Decide to obtain previous medical records or to obtain history from someone other than the patient: yes  Review and summarize past medical records: yes  Discuss the patient with other providers: yes (Dr Rudy Burkitt)    Risk of Complications, Morbidity, and/or Mortality  Presenting problems: minimal  Diagnostic procedures: minimal  Management options: minimal    Patient Progress  Patient progress: stable      Disposition  Final diagnoses:   Jaw swelling   Sialadenitis     Time reflects when diagnosis was documented in both MDM as applicable and the Disposition within this note     Time User Action Codes Description Comment    3/30/2021  3:27 PM Rachel Hatter Add [R22 0] Jaw swelling     3/30/2021  3:28 PM Rachel Hatter Add [K11 20] Sialadenitis       ED Disposition     ED Disposition Condition Date/Time Comment    Discharge Stable Tue Mar 30, 2021  3:27 PM [facility] discharge to home/self care              Follow-up Information     Follow up With Specialties Details Why Contact Info Additional Information    Subhash Jacobs DO Family Medicine Schedule an appointment as soon as possible for a visit in 2 days  [address] [facility],[place] [address]  [phone]       [facility]  If symptoms worsen [address] Emergency Department, [address], [place], [place], [address]          Discharge Medication List as of 3/30/2021  3:29 PM      CONTINUE these medications which have NOT CHANGED Details   ergocalciferol (VITAMIN D2) 50,000 units TAKE 1 CAPSULE(S) EVERY WEEK BY ORAL ROUTE IN THE MORNING FOR 90 DAYS , Historical Med      ferrous gluconate (FERGON) 324 mg tablet TAKE 1 TABLET(S) EVERY DAY BY MOUTH BEFORE MEALS FOR 30 DAYS , Historical Med      levonorgestrel (MIRENA) 20 MCG/24HR IUD 1 each by Intrauterine route once, Starting Tue 2/11/2020, Historical Med           No discharge procedures on file      PDMP Review       Value Time User    PDMP Reviewed  Yes 2/5/2021 10:21 AM Sherrill Lawson PA-C          ED Provider  Electronically Signed by           Lida Louie PA-C  03/30/21 5729

## 2021-03-31 ENCOUNTER — OFFICE VISIT (OUTPATIENT)
Dept: OBGYN CLINIC | Facility: CLINIC | Age: 46
End: 2021-03-31

## 2021-03-31 VITALS
HEIGHT: 69 IN | HEART RATE: 68 BPM | WEIGHT: 198 LBS | DIASTOLIC BLOOD PRESSURE: 83 MMHG | BODY MASS INDEX: 29.33 KG/M2 | SYSTOLIC BLOOD PRESSURE: 143 MMHG

## 2021-03-31 DIAGNOSIS — Z98.890 S/P MUSCULOSKELETAL SYSTEM SURGERY: Primary | ICD-10-CM

## 2021-03-31 PROCEDURE — 99024 POSTOP FOLLOW-UP VISIT: CPT | Performed by: SURGERY

## 2021-03-31 NOTE — PROGRESS NOTES
Assessment/Plan:  Patient ID: Glenda Cotto 39 y o  female   Surgery: Excision Ganglion Cyst - Right and Arthrotomy Radial Carpal Tunnel Joint  Date of Surgery: 2/5/2021    [facility]  8 weeks s/p right volar ganglion cyst excision  No evidence of suture today  Incision is well healed  A pulling sensation is normal post operatively  She was advised to continue with scar massage and sunscreen over the incision in the summer  Follow Up:  PRN    To Do Next Visit:        CHIEF COMPLAINT:  No chief complaint on file  SUBJECTIVE:  Glenda Cotto is a 39y o  year old female who presents for follow up after Excision Ganglion Cyst - Right and Arthrotomy Radial Carpal Tunnel Joint  Today patient states that she still feels like there is a suture in her incision  Tita Corporal notes a pulling sensation to the volar aspect of her wrist with flexion and extension       PHYSICAL EXAMINATION:  General: well developed and well nourished, alert, oriented times 3 and appears comfortable  Psychiatric: Normal    MUSCULOSKELETAL EXAMINATION:  Incision: healed  Surgery Site: normal, no evidence of infection   Range of Motion: As expected, opposition intact and full composite fist possible  Neurovascular status: Neuro intact, good cap refill  Activity Restrictions: No restrictions      STUDIES REVIEWED:  No Studies to review      PROCEDURES PERFORMED:  Procedures  No Procedures performed today       Scribe Attestation    I,:  Guille Schroeder am acting as a scribe while in the presence of the attending physician :       I,:  Carolee Estrada MD personally performed the services described in this documentation    as scribed in my presence :

## 2021-07-30 ENCOUNTER — APPOINTMENT (OUTPATIENT)
Dept: URGENT CARE | Facility: CLINIC | Age: 46
End: 2021-07-30

## 2021-07-30 DIAGNOSIS — Z00.00 PHYSICAL EXAM: ICD-10-CM

## 2021-07-30 PROCEDURE — 86480 TB TEST CELL IMMUN MEASURE: CPT | Performed by: NURSE PRACTITIONER

## 2021-08-02 LAB
GAMMA INTERFERON BACKGROUND BLD IA-ACNC: 0.02 IU/ML
M TB IFN-G BLD-IMP: NEGATIVE
M TB IFN-G CD4+ BCKGRND COR BLD-ACNC: -0.01 IU/ML
M TB IFN-G CD4+ BCKGRND COR BLD-ACNC: 0 IU/ML
MITOGEN IGNF BCKGRD COR BLD-ACNC: >10 IU/ML

## 2021-10-21 ENCOUNTER — APPOINTMENT (OUTPATIENT)
Dept: LAB | Facility: CLINIC | Age: 46
End: 2021-10-21
Payer: COMMERCIAL

## 2021-10-21 DIAGNOSIS — R73.01 IMPAIRED FASTING GLUCOSE: ICD-10-CM

## 2021-10-21 DIAGNOSIS — E55.9 AVITAMINOSIS D: ICD-10-CM

## 2021-10-21 LAB
25(OH)D3 SERPL-MCNC: 20.7 NG/ML (ref 30–100)
ANION GAP SERPL CALCULATED.3IONS-SCNC: 1 MMOL/L (ref 4–13)
BUN SERPL-MCNC: 11 MG/DL (ref 5–25)
CALCIUM SERPL-MCNC: 9.4 MG/DL (ref 8.3–10.1)
CHLORIDE SERPL-SCNC: 109 MMOL/L (ref 100–108)
CO2 SERPL-SCNC: 27 MMOL/L (ref 21–32)
CREAT SERPL-MCNC: 0.85 MG/DL (ref 0.6–1.3)
EST. AVERAGE GLUCOSE BLD GHB EST-MCNC: 117 MG/DL
GFR SERPL CREATININE-BSD FRML MDRD: 96 ML/MIN/1.73SQ M
GLUCOSE SERPL-MCNC: 113 MG/DL (ref 65–140)
HBA1C MFR BLD: 5.7 %
POTASSIUM SERPL-SCNC: 4.2 MMOL/L (ref 3.5–5.3)
SODIUM SERPL-SCNC: 137 MMOL/L (ref 136–145)

## 2021-10-21 PROCEDURE — 82306 VITAMIN D 25 HYDROXY: CPT

## 2021-10-21 PROCEDURE — 83036 HEMOGLOBIN GLYCOSYLATED A1C: CPT

## 2021-10-21 PROCEDURE — 36415 COLL VENOUS BLD VENIPUNCTURE: CPT

## 2021-10-21 PROCEDURE — 80048 BASIC METABOLIC PNL TOTAL CA: CPT

## 2021-11-22 ENCOUNTER — EVALUATION (OUTPATIENT)
Dept: PHYSICAL THERAPY | Facility: CLINIC | Age: 46
End: 2021-11-22
Payer: COMMERCIAL

## 2021-11-22 DIAGNOSIS — G89.29 CHRONIC LEFT-SIDED LOW BACK PAIN WITHOUT SCIATICA: Primary | ICD-10-CM

## 2021-11-22 DIAGNOSIS — M54.50 CHRONIC LEFT-SIDED LOW BACK PAIN WITHOUT SCIATICA: Primary | ICD-10-CM

## 2021-11-22 PROCEDURE — 97110 THERAPEUTIC EXERCISES: CPT

## 2021-11-22 PROCEDURE — 97161 PT EVAL LOW COMPLEX 20 MIN: CPT

## 2021-12-01 ENCOUNTER — OFFICE VISIT (OUTPATIENT)
Dept: PHYSICAL THERAPY | Facility: CLINIC | Age: 46
End: 2021-12-01
Payer: COMMERCIAL

## 2021-12-01 DIAGNOSIS — M54.50 CHRONIC LEFT-SIDED LOW BACK PAIN WITHOUT SCIATICA: Primary | ICD-10-CM

## 2021-12-01 DIAGNOSIS — G89.29 CHRONIC LEFT-SIDED LOW BACK PAIN WITHOUT SCIATICA: Primary | ICD-10-CM

## 2021-12-01 PROCEDURE — 97112 NEUROMUSCULAR REEDUCATION: CPT

## 2021-12-01 PROCEDURE — 97110 THERAPEUTIC EXERCISES: CPT

## 2021-12-02 ENCOUNTER — OFFICE VISIT (OUTPATIENT)
Dept: PHYSICAL THERAPY | Facility: CLINIC | Age: 46
End: 2021-12-02
Payer: COMMERCIAL

## 2021-12-02 DIAGNOSIS — G89.29 CHRONIC LEFT-SIDED LOW BACK PAIN WITHOUT SCIATICA: Primary | ICD-10-CM

## 2021-12-02 DIAGNOSIS — M54.50 CHRONIC LEFT-SIDED LOW BACK PAIN WITHOUT SCIATICA: Primary | ICD-10-CM

## 2021-12-02 PROCEDURE — 97112 NEUROMUSCULAR REEDUCATION: CPT

## 2021-12-02 PROCEDURE — 97110 THERAPEUTIC EXERCISES: CPT

## 2021-12-02 PROCEDURE — 97530 THERAPEUTIC ACTIVITIES: CPT

## 2021-12-06 ENCOUNTER — APPOINTMENT (OUTPATIENT)
Dept: PHYSICAL THERAPY | Facility: CLINIC | Age: 46
End: 2021-12-06
Payer: COMMERCIAL

## 2021-12-07 ENCOUNTER — HOSPITAL ENCOUNTER (OUTPATIENT)
Dept: RADIOLOGY | Facility: HOSPITAL | Age: 46
Discharge: HOME | End: 2021-12-07
Payer: COMMERCIAL

## 2021-12-07 DIAGNOSIS — M54.50 LOW BACK PAIN, UNSPECIFIED BACK PAIN LATERALITY, UNSPECIFIED CHRONICITY, UNSPECIFIED WHETHER SCIATICA PRESENT: ICD-10-CM

## 2021-12-07 PROCEDURE — 72100 X-RAY EXAM L-S SPINE 2/3 VWS: CPT

## 2021-12-08 ENCOUNTER — OFFICE VISIT (OUTPATIENT)
Dept: PHYSICAL THERAPY | Facility: CLINIC | Age: 46
End: 2021-12-08
Payer: COMMERCIAL

## 2021-12-08 DIAGNOSIS — G89.29 CHRONIC LEFT-SIDED LOW BACK PAIN WITHOUT SCIATICA: Primary | ICD-10-CM

## 2021-12-08 DIAGNOSIS — M54.50 CHRONIC LEFT-SIDED LOW BACK PAIN WITHOUT SCIATICA: Primary | ICD-10-CM

## 2021-12-08 PROCEDURE — 97112 NEUROMUSCULAR REEDUCATION: CPT

## 2021-12-08 PROCEDURE — 97530 THERAPEUTIC ACTIVITIES: CPT

## 2021-12-08 PROCEDURE — 97110 THERAPEUTIC EXERCISES: CPT

## 2021-12-09 ENCOUNTER — APPOINTMENT (OUTPATIENT)
Dept: PHYSICAL THERAPY | Facility: CLINIC | Age: 46
End: 2021-12-09
Payer: COMMERCIAL

## 2021-12-13 ENCOUNTER — APPOINTMENT (OUTPATIENT)
Dept: PHYSICAL THERAPY | Facility: CLINIC | Age: 46
End: 2021-12-13
Payer: COMMERCIAL

## 2021-12-16 ENCOUNTER — APPOINTMENT (OUTPATIENT)
Dept: PHYSICAL THERAPY | Facility: CLINIC | Age: 46
End: 2021-12-16
Payer: COMMERCIAL

## 2021-12-20 ENCOUNTER — APPOINTMENT (OUTPATIENT)
Dept: PHYSICAL THERAPY | Facility: CLINIC | Age: 46
End: 2021-12-20
Payer: COMMERCIAL

## 2021-12-21 ENCOUNTER — HOSPITAL ENCOUNTER (OUTPATIENT)
Dept: RADIOLOGY | Facility: HOSPITAL | Age: 46
Discharge: HOME | End: 2021-12-21
Payer: COMMERCIAL

## 2021-12-21 DIAGNOSIS — M79.671 RIGHT FOOT PAIN: ICD-10-CM

## 2021-12-21 PROCEDURE — 73630 X-RAY EXAM OF FOOT: CPT

## 2021-12-23 ENCOUNTER — APPOINTMENT (OUTPATIENT)
Dept: PHYSICAL THERAPY | Facility: CLINIC | Age: 46
End: 2021-12-23
Payer: COMMERCIAL

## 2021-12-27 ENCOUNTER — APPOINTMENT (OUTPATIENT)
Dept: PHYSICAL THERAPY | Facility: CLINIC | Age: 46
End: 2021-12-27
Payer: COMMERCIAL

## 2021-12-30 ENCOUNTER — APPOINTMENT (OUTPATIENT)
Dept: PHYSICAL THERAPY | Facility: CLINIC | Age: 46
End: 2021-12-30
Payer: COMMERCIAL

## 2022-01-06 ENCOUNTER — TELEMEDICINE (OUTPATIENT)
Dept: FAMILY MEDICINE CLINIC | Facility: CLINIC | Age: 47
End: 2022-01-06
Payer: COMMERCIAL

## 2022-01-06 VITALS — HEART RATE: 90 BPM | TEMPERATURE: 97.1 F | OXYGEN SATURATION: 98 %

## 2022-01-06 DIAGNOSIS — B34.9 ACUTE VIRAL SYNDROME: Primary | ICD-10-CM

## 2022-01-06 DIAGNOSIS — Z13.6 SCREENING FOR CARDIOVASCULAR CONDITION: ICD-10-CM

## 2022-01-06 DIAGNOSIS — Z20.822 EXPOSURE TO COVID-19 VIRUS: ICD-10-CM

## 2022-01-06 DIAGNOSIS — Z87.898 HISTORY OF PREDIABETES: ICD-10-CM

## 2022-01-06 DIAGNOSIS — R03.0 ELEVATED BLOOD PRESSURE READING WITHOUT DIAGNOSIS OF HYPERTENSION: ICD-10-CM

## 2022-01-06 PROCEDURE — U0003 INFECTIOUS AGENT DETECTION BY NUCLEIC ACID (DNA OR RNA); SEVERE ACUTE RESPIRATORY SYNDROME CORONAVIRUS 2 (SARS-COV-2) (CORONAVIRUS DISEASE [COVID-19]), AMPLIFIED PROBE TECHNIQUE, MAKING USE OF HIGH THROUGHPUT TECHNOLOGIES AS DESCRIBED BY CMS-2020-01-R: HCPCS | Performed by: FAMILY MEDICINE

## 2022-01-06 PROCEDURE — 99204 OFFICE O/P NEW MOD 45 MIN: CPT | Performed by: FAMILY MEDICINE

## 2022-01-06 NOTE — ASSESSMENT & PLAN NOTE
Recent close exposure to covid  Initial home covid was negative but patent was asymptomatic at the time   Afebrile with mild presentation   Pt also vaccinated for the initial series   Discussed treating symptoms as if they are covid or retesting as I believe the last test was falsly negative   COVID pcr ordered  Recommend Vit D, C, and zinc   Isolation required while waiting for the results  Work note also emailed

## 2022-01-06 NOTE — PROGRESS NOTES
Virtual Regular Visit    Verification of patient location:    Patient is located in the following state in which I hold an active license [place]      Assessment/Plan:    Problem List Items Addressed This Visit        Endocrine    History of prediabetes     A1c 10/21/21 was 5 7  Will recheck and follow up at next visit          Relevant Orders    HEMOGLOBIN A1C W/ EAG ESTIMATION    Lipid panel       Other    Acute viral syndrome - Primary     Recent close exposure to covid  Initial home covid was negative but patent was asymptomatic at the time   Afebrile with mild presentation   Pt also vaccinated for the initial series   Discussed treating symptoms as if they are covid or retesting as I believe the last test was falsly negative   COVID pcr ordered  Recommend Vit D, C, and zinc   Isolation required while waiting for the results  Work note also emailed          Relevant Orders    COVID Only- Collected at [facility] or [facility]    Exposure to COVID-19 virus    Relevant Orders    COVID Only- Collected at [facility] or [facility]    Elevated blood pressure reading without diagnosis of hypertension     Bps have been elevated this past week on a wrist BP cuff   Will check blood work and have patient come in for BP check            Other Visit Diagnoses     Screening for cardiovascular condition        Relevant Orders    Lipid panel            Depression Screening and Follow-up Plan: Patient was screened for depression during today's encounter  They screened negative with a PHQ-2 score of 0  Reason for visit is   Chief Complaint   Patient presents with    Nasal Congestion     no body aches or fever, but does have nasal congestion , cough, fatigue         Encounter provider Jimmie Dye MD    Provider located at [place] [address] [place] [address]      Recent Visits  No visits were found meeting these conditions    Showing recent visits within past 7 days and meeting all other requirements  Today's Visits  Date Type Provider Dept   01/06/22 Telemedicine MD Micky Peterson today's visits and meeting all other requirements  Future Appointments  No visits were found meeting these conditions  Showing future appointments within next 150 days and meeting all other requirements       The patient was identified by name and date of birth  Alondra Wen was informed that this is a telemedicine visit and that the visit is being conducted through [address] [facility] and patient was informed that this is a secure, HIPAA-compliant platform  She agrees to proceed     My office door was closed  No one else was in the room  She acknowledged consent and understanding of privacy and security of the video platform  The patient has agreed to participate and understands they can discontinue the visit at any time  Patient is aware this is a billable service  Subjective  Alondra Wen is a 42 y o  female seen as a new patient with viral symptoms  Originally, the appt was scheduled to discuss BP and request blood work  Was seeing Dr Niya Hurtado, who no longer accepts her insurance)  Hasn't seen a provider in 2 years  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)  Tuesday,  test positive for COVID  She tested herself and was negative  She did not have symptoms at the time  The following day ( yesterday), she developed body aches, cough, and fatigue  Feeling better today  Mild cough and fatigue  Denies fevers  Fully vaccinated with primary series  Requesting COVID test    In terms of her BP, they have been high and associated with dizziness  She does not have a diagnosis of HTN  Last few Bps measured 146/85 and 179/86 on a wrist BP cuff  Also requesting blood work  Has a history of prediabetes        Past Medical History:   Diagnosis Date    Abnormal Pap smear of cervix     Allergic rhinitis     Anemia     Dizziness     Seasonal allergies     Vertigo Past Surgical History:   Procedure Laterality Date    ARTHROTOMY ANKLE  2/5/2021    Procedure: ARTHROTOMY RADIAL CARPAL TUNNEL JOINT;  Surgeon: Margo Weber MD;  [facility];  Service: Orthopedics    BREAST EXCISIONAL BIOPSY Left 1994    benign    [place] EXCIS PRIMARY GANGLION WRIST Right 2/5/2021    Procedure: EXCISION GANGLION CYST;  Surgeon: Margo Weber MD;  [facility];  Service: Orthopedics    TUBAL LIGATION         Current Outpatient Medications   Medication Sig Dispense Refill    ergocalciferol (VITAMIN D2) 50,000 units TAKE 1 CAPSULE(S) EVERY WEEK BY ORAL ROUTE IN THE MORNING FOR 90 DAYS   levonorgestrel (MIRENA) 20 MCG/24HR IUD 1 each by Intrauterine route once      ferrous gluconate (FERGON) 324 mg tablet TAKE 1 TABLET(S) EVERY DAY BY MOUTH BEFORE MEALS FOR 30 DAYS  No current facility-administered medications for this visit  Allergies   Allergen Reactions    Shellfish-Derived Products - Food Allergy Throat Swelling       Review of Systems   Constitutional: Negative for fever  HENT: Positive for congestion  Respiratory: Negative for cough, chest tightness, shortness of breath and wheezing  Gastrointestinal: Negative for constipation, diarrhea, nausea and vomiting  Neurological: Positive for dizziness  Negative for headaches  Video Exam    Vitals:    01/06/22 1040   Pulse: 90   Temp: (!) 97 1 °F (36 2 °C)   SpO2: 98%       Physical Exam  Vitals reviewed  Constitutional:       General: She is not in acute distress  Appearance: Normal appearance  She is not ill-appearing  HENT:      Head: Normocephalic and atraumatic  Eyes:      Extraocular Movements: Extraocular movements intact  Pulmonary:      Effort: Pulmonary effort is normal  No respiratory distress  Skin:     Coloration: Skin is not pale  Neurological:      General: No focal deficit present        Mental Status: She is alert and oriented to person, place, and time    Psychiatric:         Mood and Affect: Mood normal          Behavior: Behavior normal          Thought Content: Thought content normal          Judgment: Judgment normal           I spent 17 minutes minutes with patient today in which greater than 50% of the time was spent in counseling/coordination of care regarding managment and differentials    VIRTUAL VISIT [address] [place] verbally agrees to participate in [employer]  Pt is aware that [employer] could be limited without vital signs or the ability to perform a full hands-on physical [address] understands she or the provider may request at any time to terminate the video visit and request the patient to seek care or treatment in person

## 2022-01-06 NOTE — ASSESSMENT & PLAN NOTE
Bps have been elevated this past week on a wrist BP cuff   Will check blood work and have patient come in for BP check

## 2022-01-10 ENCOUNTER — TELEPHONE (OUTPATIENT)
Dept: FAMILY MEDICINE CLINIC | Facility: CLINIC | Age: 47
End: 2022-01-10

## 2022-01-10 NOTE — TELEPHONE ENCOUNTER
Pt requests a note to return to work on 1/10/2022  Pt was out of work since 1/5/2022  Pt no longer has symptoms and her test was negative  Pt requests it be sent via [address]

## 2022-01-18 ENCOUNTER — TELEMEDICINE (OUTPATIENT)
Dept: FAMILY MEDICINE CLINIC | Facility: CLINIC | Age: 47
End: 2022-01-18
Payer: COMMERCIAL

## 2022-01-18 DIAGNOSIS — U07.1 COVID-19: Primary | ICD-10-CM

## 2022-01-18 PROCEDURE — 99213 OFFICE O/P EST LOW 20 MIN: CPT | Performed by: FAMILY MEDICINE

## 2022-01-18 NOTE — PROGRESS NOTES
COVID-19 Outpatient Progress Note    Assessment/Plan:    Problem List Items Addressed This Visit        Other    COVID-19 - Primary         Disposition:     I have spent 13 minutes directly with the patient  Greater than 50% of this time was spent in counseling/coordination of care regarding: instructions for management, risk factor reductions and impressions  Day 4 of illness   Fully vaccinated but is not due for the booster   Mild presentation   No respiratory issues   Continue tylenol and Vicks   Would add Vit C, vit D, and zinc  Will need 5 days of quarantine with 5 additional days of strict mask wearing   Note provided to allow patient a few more days at home   Call/ ER precautions reviewed   Will call in 2 days to follow up      Encounter provider Inna Andrews MD    Provider located at [address] [address]    Recent Visits  No visits were found meeting these conditions  Showing recent visits within past 7 days and meeting all other requirements  Today's Visits  Date Type Provider Dept   01/18/22 Telemedicine Inna Andrews MD  [place] today's visits and meeting all other requirements  Future Appointments  No visits were found meeting these conditions  Showing future appointments within next 150 days and meeting all other requirements     This virtual check-in was done via [employer] and patient was informed that this is a secure, HIPAA-compliant platform  She agrees to proceed  Patient agrees to participate in a virtual check in via telephone or video visit instead of presenting to the office to address urgent/immediate medical needs  Patient is aware this is a billable service  After connecting through [place], the patient was identified by name and date of birth  Jason Argue was informed that this was a telemedicine visit and that the exam was being conducted confidentially over secure lines   My office door was closed  No one else was in the room  Clarence Rutherford acknowledged consent and understanding of privacy and security of the telemedicine visit  I informed the patient that I have reviewed her record in Epic and presented the opportunity for her to ask any questions regarding the visit today  The patient agreed to participate  Verification of patient location:  Patient is located in the following state in which I hold an active license: [place]    Subjective:   Clarence Rutherford is a 42 y o  female who is concerned about COVID-19  Patient's symptoms include nasal congestion, rhinorrhea, sore throat, cough and myalgias (right flank pain )  Patient denies fever, anosmia, loss of taste, shortness of breath, chest tightness, nausea, vomiting and diarrhea       - Date of symptom onset: 1/14/2022      COVID-19 vaccination status: Fully vaccinated (primary series)      Day 4 of illness   Fully vaccinated with primary series  Taking tylenol and Vicks  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   Contracted it from her    Requesting a work note as she does not feel well enough to return tomorrow     Lab Results   Component Value Date    SARSCOV2 Negative 01/06/2022    [address] [address] [facility] Not Detected 06/30/2020     Past Medical History:   Diagnosis Date    Abnormal Pap smear of cervix     Allergic rhinitis     Anemia     Dizziness     Seasonal allergies     Vertigo      Past Surgical History:   Procedure Laterality Date    ARTHROTOMY ANKLE  2/5/2021    Procedure: ARTHROTOMY RADIAL CARPAL TUNNEL JOINT;  Surgeon: Wilman Cardozo MD;  [facility];  Service: Orthopedics    BREAST EXCISIONAL BIOPSY Left 1994    benign    [place] EXCIS PRIMARY GANGLION WRIST Right 2/5/2021    Procedure: EXCISION GANGLION CYST;  Surgeon: Wilman Cardozo MD;  [facility];  Service: Orthopedics    TUBAL LIGATION       Current Outpatient Medications   Medication Sig Dispense Refill    Diclofenac Sodium (VOLTAREN) 1 %  ergocalciferol (VITAMIN D2) 50,000 units TAKE 1 CAPSULE(S) EVERY WEEK BY ORAL ROUTE IN THE MORNING FOR 90 DAYS   ferrous gluconate (FERGON) 324 mg tablet TAKE 1 TABLET(S) EVERY DAY BY MOUTH BEFORE MEALS FOR 30 DAYS   levonorgestrel (MIRENA) 20 MCG/24HR IUD 1 each by Intrauterine route once       No current facility-administered medications for this visit  Allergies   Allergen Reactions    Shellfish-Derived Products - Food Allergy Throat Swelling       Review of Systems   Constitutional: Negative for fever  HENT: Positive for congestion, rhinorrhea and sore throat  Respiratory: Positive for cough  Negative for chest tightness and shortness of breath  Gastrointestinal: Negative for diarrhea, nausea and vomiting  Musculoskeletal: Positive for myalgias (right flank pain )  Objective:    Vitals:       Physical Exam  Constitutional:       Appearance: She is ill-appearing (mild )  HENT:      Head: Normocephalic and atraumatic  Nose: Congestion present  Pulmonary:      Effort: Pulmonary effort is normal  No respiratory distress  Skin:     Coloration: Skin is not pale  Psychiatric:         Mood and Affect: Mood normal          Behavior: Behavior normal          Thought Content: Thought content normal          VIRTUAL VISIT [facility] verbally agrees to participate in [employer]  Pt is aware that [employer] could be limited without vital signs or the ability to perform a full hands-on physical [address] understands she or the provider may request at any time to terminate the video visit and request the patient to seek care or treatment in person

## 2022-01-18 NOTE — LETTER
January 18, 2022    Patient: Clarence Rutherford  YOB: 1975  Date of Last Encounter: 1/10/2022      To whom it may concern:     Clarence Rutherford has tested positive for COVID-19 (Coronavirus)  She is day 4 of illness  She continues to have symptoms and wishes to remain at home for an additional 2 days  At that point, we will reassess her symptom and determine her return date       Sincerely,         Destinee Ferrer MD

## 2022-01-20 ENCOUNTER — PATIENT MESSAGE (OUTPATIENT)
Dept: FAMILY MEDICINE CLINIC | Facility: CLINIC | Age: 47
End: 2022-01-20

## 2022-01-24 ENCOUNTER — APPOINTMENT (OUTPATIENT)
Dept: LAB | Facility: CLINIC | Age: 47
End: 2022-01-24
Payer: COMMERCIAL

## 2022-01-24 DIAGNOSIS — Z87.898 HISTORY OF PREDIABETES: ICD-10-CM

## 2022-01-24 DIAGNOSIS — R03.0 ELEVATED BLOOD PRESSURE READING WITHOUT DIAGNOSIS OF HYPERTENSION: ICD-10-CM

## 2022-01-24 DIAGNOSIS — Z13.6 SCREENING FOR CARDIOVASCULAR CONDITION: ICD-10-CM

## 2022-01-24 LAB
ALBUMIN SERPL BCP-MCNC: 3.9 G/DL (ref 3.5–5)
ALP SERPL-CCNC: 67 U/L (ref 46–116)
ALT SERPL W P-5'-P-CCNC: 28 U/L (ref 12–78)
ANION GAP SERPL CALCULATED.3IONS-SCNC: 6 MMOL/L (ref 4–13)
AST SERPL W P-5'-P-CCNC: 22 U/L (ref 5–45)
BILIRUB SERPL-MCNC: 1.28 MG/DL (ref 0.2–1)
BUN SERPL-MCNC: 11 MG/DL (ref 5–25)
CALCIUM SERPL-MCNC: 9.4 MG/DL (ref 8.3–10.1)
CHLORIDE SERPL-SCNC: 107 MMOL/L (ref 100–108)
CHOLEST SERPL-MCNC: 183 MG/DL
CO2 SERPL-SCNC: 26 MMOL/L (ref 21–32)
CREAT SERPL-MCNC: 0.98 MG/DL (ref 0.6–1.3)
EST. AVERAGE GLUCOSE BLD GHB EST-MCNC: 126 MG/DL
GFR SERPL CREATININE-BSD FRML MDRD: 70 ML/MIN/1.73SQ M
GLUCOSE P FAST SERPL-MCNC: 116 MG/DL (ref 65–99)
HBA1C MFR BLD: 6 %
HDLC SERPL-MCNC: 40 MG/DL
LDLC SERPL CALC-MCNC: 99 MG/DL (ref 0–100)
NONHDLC SERPL-MCNC: 143 MG/DL
POTASSIUM SERPL-SCNC: 4.1 MMOL/L (ref 3.5–5.3)
PROT SERPL-MCNC: 8.2 G/DL (ref 6.4–8.2)
SODIUM SERPL-SCNC: 139 MMOL/L (ref 136–145)
TRIGL SERPL-MCNC: 218 MG/DL

## 2022-01-24 PROCEDURE — 80053 COMPREHEN METABOLIC PANEL: CPT

## 2022-01-24 PROCEDURE — 83036 HEMOGLOBIN GLYCOSYLATED A1C: CPT

## 2022-01-24 PROCEDURE — 36415 COLL VENOUS BLD VENIPUNCTURE: CPT

## 2022-01-24 PROCEDURE — 80061 LIPID PANEL: CPT

## 2022-02-02 ENCOUNTER — ANNUAL EXAM (OUTPATIENT)
Dept: OBGYN CLINIC | Facility: CLINIC | Age: 47
End: 2022-02-02
Payer: COMMERCIAL

## 2022-02-02 ENCOUNTER — OFFICE VISIT (OUTPATIENT)
Dept: FAMILY MEDICINE CLINIC | Facility: CLINIC | Age: 47
End: 2022-02-02
Payer: COMMERCIAL

## 2022-02-02 VITALS
SYSTOLIC BLOOD PRESSURE: 140 MMHG | TEMPERATURE: 97.6 F | OXYGEN SATURATION: 99 % | RESPIRATION RATE: 16 BRPM | HEIGHT: 68 IN | WEIGHT: 197.6 LBS | HEART RATE: 64 BPM | DIASTOLIC BLOOD PRESSURE: 100 MMHG | BODY MASS INDEX: 29.95 KG/M2

## 2022-02-02 VITALS
WEIGHT: 198.2 LBS | DIASTOLIC BLOOD PRESSURE: 82 MMHG | HEIGHT: 68 IN | SYSTOLIC BLOOD PRESSURE: 112 MMHG | BODY MASS INDEX: 30.04 KG/M2

## 2022-02-02 DIAGNOSIS — Z11.51 SCREENING FOR HUMAN PAPILLOMAVIRUS (HPV): ICD-10-CM

## 2022-02-02 DIAGNOSIS — E80.6 HYPERBILIRUBINEMIA: Primary | ICD-10-CM

## 2022-02-02 DIAGNOSIS — Z01.419 ENCOUNTER FOR ANNUAL ROUTINE GYNECOLOGICAL EXAMINATION: Primary | ICD-10-CM

## 2022-02-02 DIAGNOSIS — Z11.59 ENCOUNTER FOR HEPATITIS C SCREENING TEST FOR LOW RISK PATIENT: ICD-10-CM

## 2022-02-02 DIAGNOSIS — D50.9 IRON DEFICIENCY ANEMIA, UNSPECIFIED IRON DEFICIENCY ANEMIA TYPE: ICD-10-CM

## 2022-02-02 DIAGNOSIS — Z30.431 IUD CHECK UP: ICD-10-CM

## 2022-02-02 DIAGNOSIS — R03.0 ELEVATED BLOOD PRESSURE READING WITHOUT DIAGNOSIS OF HYPERTENSION: ICD-10-CM

## 2022-02-02 DIAGNOSIS — U07.1 COVID-19: ICD-10-CM

## 2022-02-02 DIAGNOSIS — R73.03 PREDIABETES: ICD-10-CM

## 2022-02-02 DIAGNOSIS — Z83.49 FAMILY HISTORY OF THYROID DISEASE: ICD-10-CM

## 2022-02-02 DIAGNOSIS — Z12.31 ENCOUNTER FOR SCREENING MAMMOGRAM FOR MALIGNANT NEOPLASM OF BREAST: ICD-10-CM

## 2022-02-02 DIAGNOSIS — Z87.898 HISTORY OF PREDIABETES: ICD-10-CM

## 2022-02-02 PROBLEM — Z20.822 EXPOSURE TO COVID-19 VIRUS: Status: RESOLVED | Noted: 2022-01-06 | Resolved: 2022-02-02

## 2022-02-02 PROBLEM — B34.9 ACUTE VIRAL SYNDROME: Status: RESOLVED | Noted: 2022-01-06 | Resolved: 2022-02-02

## 2022-02-02 PROBLEM — M41.35 THORACOGENIC SCOLIOSIS OF THORACOLUMBAR REGION: Status: ACTIVE | Noted: 2022-02-02

## 2022-02-02 PROCEDURE — S0612 ANNUAL GYNECOLOGICAL EXAMINA: HCPCS | Performed by: OBSTETRICS & GYNECOLOGY

## 2022-02-02 PROCEDURE — G0476 HPV COMBO ASSAY CA SCREEN: HCPCS | Performed by: OBSTETRICS & GYNECOLOGY

## 2022-02-02 PROCEDURE — 99214 OFFICE O/P EST MOD 30 MIN: CPT | Performed by: FAMILY MEDICINE

## 2022-02-02 PROCEDURE — G0145 SCR C/V CYTO,THINLAYER,RESCR: HCPCS | Performed by: OBSTETRICS & GYNECOLOGY

## 2022-02-02 RX ORDER — LISINOPRIL 5 MG/1
5 TABLET ORAL DAILY
Qty: 30 TABLET | Refills: 1 | Status: SHIPPED | OUTPATIENT
Start: 2022-02-02 | End: 2022-02-24

## 2022-02-02 NOTE — PROGRESS NOTES
Assessment/Plan:    1  Encounter for annual routine gynecological examination    - Liquid-based pap, screening    2  Screening for human papillomavirus (HPV)    - Liquid-based pap, screening    3  Encounter for screening mammogram for malignant neoplasm of breast    - Mammo screening bilateral w 3d & cad; Future    4  IUD check up          25 Martha Whelan is a 42 y o  female who presents for annual exam  Periods are regular every 4 weeks, lasting a few days  She past a large clot and had a little bit of a heavier bleed this past month  Dysmenorrhea:none  Cyclic symptoms: none  No intermenstrual bleeding, spotting, or discharge  The patient denies any urinary or sexual concerns  Current contraception: IUD  History of abnormal Pap smear: yes  Regular self breast exam: yes  History of abnormal mammogram: no  History of abnormal lipids: no    Menstrual History:  OB History        4    Para   3    Term   3            AB   1    Living   3       SAB        IAB        Ectopic        Multiple        Live Births   2                  Patient's last menstrual period was 2022 (exact date)  Period Cycle (Days):  (Monthly)  Period Duration (Days): varies  Period Pattern: Regular  Menstrual Flow: Light  Dysmenorrhea: None    Past Medical History:   Diagnosis Date    Abnormal Pap smear of cervix     Allergic rhinitis     Anemia     Dizziness     Seasonal allergies     Vertigo        Family History   Problem Relation Age of Onset    No Known Problems Mother     Sarcoidosis Father     Breast cancer Sister 39    Breast cancer Maternal Grandmother 79       The following portions of the patient's history were reviewed and updated as appropriate: allergies, current medications, past family history, past medical history, past social history, past surgical history and problem list     Review of Systems  Pertinent items are noted in HPI        Objective      /82 (BP Location: Right arm, Patient Position: Sitting, Cuff Size: Large)   Ht 5' 8" (1 727 m)   Wt 89 9 kg (198 lb 3 2 oz)   LMP 01/23/2022 (Exact Date)   BMI 30 14 kg/m²     General:   alert and oriented, in no acute distress, appears stated age and cooperative   Heart:    Breasts: regular rate and rhythm, S1, S2 normal, no murmur, click, rub or gallop   appear normal, no suspicious masses, no skin or nipple changes or axillary nodes     Lungs: clear to auscultation bilaterally   Abdomen: soft, non-tender, without masses or organomegaly   Vulva: normal   Vagina: normal mucosa   Cervix: no lesions and IUD strings seen   Uterus: normal size, mobile, non-tender   Adnexa: normal adnexa and no mass, fullness, tenderness

## 2022-02-02 NOTE — PROGRESS NOTES
Assessment/Plan:         Problem List Items Addressed This Visit        Endocrine    History of prediabetes     A1c up 6 0 from 5 7   Recommend the patient follow a low fat, low cholesterol diet, limiting refined carbohydrates like white bread, white rice, white pasta, chips/pretzels, sweets/desserts, and sugary beverages  Increase fruits/vegetables  Increase exercise as tolerated              Other    Iron deficiency anemia    Relevant Orders    Iron Panel (Includes Ferritin, Iron Sat%, Iron, and TIBC)    Elevated blood pressure reading without diagnosis of hypertension     Elevated BP readings at home confirmed in office   Has been experiencing occasional headaches  BP today 140/100 on repeat   Start lisinopril 5 mg daily   Monitor BP for a week  Goal less than 130/90         Relevant Medications    lisinopril (ZESTRIL) 5 mg tablet    Hyperbilirubinemia - Primary     With normal LFTs   Will check direct and indirect bili along with CBC         Relevant Orders    CBC and differential    Bilirubin, Total and Direct    Family history of thyroid disease     Mom has graves disease and notes fullness in the neck   Did not appreciate any nodules or thyromegaly   Will check TSH         Relevant Orders    TSH, 3rd generation with Free T4 reflex    Prediabetes     A1c up 6 0 from 5 7   Recommend the patient follow a low fat, low cholesterol diet, limiting refined carbohydrates like white bread, white rice, white pasta, chips/pretzels, sweets/desserts, and sugary beverages  Increase fruits/vegetables  Increase exercise as tolerated  Also discussed starting low dose metformin but prefer lifestyle therapy          Relevant Orders    HEMOGLOBIN A1C W/ EAG ESTIMATION    RESOLVED: COVID-19      Other Visit Diagnoses     Encounter for hepatitis C screening test for low risk patient        Relevant Orders    Hepatitis C antibody            Subjective:       Patient ID: Ronald Antony is a 42 y o  female seen today for follow up  Cleared covid last month  Has a lingering cough is overall feeling good  Had blood work that showed an elevated bilirubin level measuring 1 28 with a normal ALP, AST, and ALT  Does report bloating but no reflux, changes in bowels, mid epigastric pain, or water brash  A1c was also elevated at 6  Had pap earlier today and awaiting results  COVID and flu UTD  BP have also been elevated  She occasionally experiences headaches  No chest pain or SOB  The following portions of the patient's history were reviewed and updated as appropriate:   She  has a past medical history of Abnormal Pap smear of cervix, Allergic rhinitis, Anemia, Dizziness, Seasonal allergies, and Vertigo  She   Patient Active Problem List    Diagnosis Date Noted    Hyperbilirubinemia 02/02/2022    Thoracogenic scoliosis of thoracolumbar region 02/02/2022    Family history of thyroid disease 02/02/2022    Prediabetes 02/02/2022    History of prediabetes 01/06/2022    Elevated blood pressure reading without diagnosis of hypertension 01/06/2022    Mass of right wrist 02/05/2021    Allergic rhinitis     Vertigo     IUD (intrauterine device) in place 03/16/2020    IUD check up 03/16/2020    Screening for malignant neoplasm of breast 01/28/2019    Abnormal bleeding in menstrual cycle 01/28/2019    Iron deficiency anemia 01/28/2019    Encounter for gynecological examination (general) (routine) without abnormal findings 01/28/2019     She  has a past surgical history that includes Tubal ligation; pr excis primary ganglion wrist (Right, 2/5/2021); ARTHROTOMY ANKLE (2/5/2021); Breast excisional biopsy (Left, 1994); and Gynecologic cryosurgery  Her family history includes Breast cancer (age of onset: 39) in her sister; Breast cancer (age of onset: 79) in her maternal grandmother; No Known Problems in her mother; Sarcoidosis in her father  She  reports that she has never smoked   She has never used smokeless tobacco  She reports that she does not drink alcohol and does not use drugs  Current Outpatient Medications on File Prior to Visit   Medication Sig    Diclofenac Sodium (VOLTAREN) 1 %     ergocalciferol (VITAMIN D2) 50,000 units TAKE 1 CAPSULE(S) EVERY WEEK BY ORAL ROUTE IN THE MORNING FOR 90 DAYS   ferrous gluconate (FERGON) 324 mg tablet TAKE 1 TABLET(S) EVERY DAY BY MOUTH BEFORE MEALS FOR 30 DAYS   levonorgestrel (MIRENA) 20 MCG/24HR IUD 1 each by Intrauterine route once     No current facility-administered medications on file prior to visit  She is allergic to shellfish-derived products - food allergy       Review of Systems   Respiratory: Negative for shortness of breath  Cardiovascular: Negative for chest pain and palpitations  Neurological: Positive for headaches  Negative for light-headedness  Objective:      /100   Pulse 64   Temp 97 6 °F (36 4 °C) (Tympanic)   Resp 16   Ht 5' 8" (1 727 m)   Wt 89 6 kg (197 lb 9 6 oz)   LMP 01/23/2022 (Exact Date)   SpO2 99%   BMI 30 04 kg/m²          Physical Exam  Constitutional:       General: She is not in acute distress  Appearance: Normal appearance  She is not ill-appearing, toxic-appearing or diaphoretic  HENT:      Head: Normocephalic and atraumatic  Right Ear: Tympanic membrane normal       Left Ear: Tympanic membrane normal    Eyes:      Extraocular Movements: Extraocular movements intact  Cardiovascular:      Rate and Rhythm: Normal rate and regular rhythm  Heart sounds: Murmur (2/3 RUSB) heard  Pulmonary:      Effort: Pulmonary effort is normal       Breath sounds: Normal breath sounds  Abdominal:      General: Abdomen is flat  There is no distension  Palpations: There is no mass  Tenderness: There is no abdominal tenderness  There is no guarding or rebound  Hernia: No hernia is present  Musculoskeletal:      Cervical back: Neck supple  Right lower leg: No edema  Left lower leg: No edema     Neurological: Mental Status: She is alert  Psychiatric:         Mood and Affect: Mood normal          Behavior: Behavior normal          Thought Content:  Thought content normal          Judgment: Judgment normal

## 2022-02-02 NOTE — ASSESSMENT & PLAN NOTE
Mom has graves disease and notes fullness in the neck   Did not appreciate any nodules or thyromegaly   Will check TSH

## 2022-02-02 NOTE — ASSESSMENT & PLAN NOTE
A1c up 6 0 from 5 7   Recommend the patient follow a low fat, low cholesterol diet, limiting refined carbohydrates like white bread, white rice, white pasta, chips/pretzels, sweets/desserts, and sugary beverages  Increase fruits/vegetables   Increase exercise as tolerated  Also discussed starting low dose metformin but prefer lifestyle therapy

## 2022-02-02 NOTE — ASSESSMENT & PLAN NOTE
Elevated BP readings at home confirmed in office   Has been experiencing occasional headaches  BP today 140/100 on repeat   Start lisinopril 5 mg daily   Monitor BP for a week   Goal less than 130/90

## 2022-02-02 NOTE — ASSESSMENT & PLAN NOTE
A1c up 6 0 from 5 7   Recommend the patient follow a low fat, low cholesterol diet, limiting refined carbohydrates like white bread, white rice, white pasta, chips/pretzels, sweets/desserts, and sugary beverages  Increase fruits/vegetables   Increase exercise as tolerated

## 2022-02-08 LAB
LAB AP GYN PRIMARY INTERPRETATION: NORMAL
LAB AP LMP: NORMAL
Lab: NORMAL

## 2022-02-24 DIAGNOSIS — R03.0 ELEVATED BLOOD PRESSURE READING WITHOUT DIAGNOSIS OF HYPERTENSION: ICD-10-CM

## 2022-02-24 RX ORDER — LISINOPRIL 5 MG/1
TABLET ORAL
Qty: 30 TABLET | Refills: 1 | Status: SHIPPED | OUTPATIENT
Start: 2022-02-24 | End: 2022-03-22

## 2022-03-22 DIAGNOSIS — R03.0 ELEVATED BLOOD PRESSURE READING WITHOUT DIAGNOSIS OF HYPERTENSION: ICD-10-CM

## 2022-03-22 RX ORDER — LISINOPRIL 5 MG/1
TABLET ORAL
Qty: 30 TABLET | Refills: 1 | Status: SHIPPED | OUTPATIENT
Start: 2022-03-22 | End: 2022-04-11 | Stop reason: SDUPTHER

## 2022-03-30 ENCOUNTER — PATIENT MESSAGE (OUTPATIENT)
Dept: FAMILY MEDICINE CLINIC | Facility: CLINIC | Age: 47
End: 2022-03-30

## 2022-03-30 DIAGNOSIS — R22.31 MASS OF RIGHT WRIST: Primary | ICD-10-CM

## 2022-03-31 ENCOUNTER — PATIENT MESSAGE (OUTPATIENT)
Dept: FAMILY MEDICINE CLINIC | Facility: CLINIC | Age: 47
End: 2022-03-31

## 2022-03-31 DIAGNOSIS — L70.0 ACNE VULGARIS: Primary | ICD-10-CM

## 2022-04-06 ENCOUNTER — HOSPITAL ENCOUNTER (OUTPATIENT)
Dept: RADIOLOGY | Age: 47
Discharge: HOME | End: 2022-04-06
Payer: COMMERCIAL

## 2022-04-06 VITALS — HEIGHT: 69 IN | WEIGHT: 197 LBS | BODY MASS INDEX: 29.18 KG/M2

## 2022-04-06 DIAGNOSIS — Z12.31 ENCOUNTER FOR SCREENING MAMMOGRAM FOR MALIGNANT NEOPLASM OF BREAST: ICD-10-CM

## 2022-04-06 PROCEDURE — 77067 SCR MAMMO BI INCL CAD: CPT

## 2022-04-06 PROCEDURE — 77063 BREAST TOMOSYNTHESIS BI: CPT

## 2022-04-11 DIAGNOSIS — R03.0 ELEVATED BLOOD PRESSURE READING WITHOUT DIAGNOSIS OF HYPERTENSION: ICD-10-CM

## 2022-04-11 RX ORDER — LISINOPRIL 5 MG/1
5 TABLET ORAL DAILY
Qty: 90 TABLET | Refills: 3 | Status: SHIPPED | OUTPATIENT
Start: 2022-04-11 | End: 2022-05-03 | Stop reason: SDUPTHER

## 2022-04-27 ENCOUNTER — APPOINTMENT (OUTPATIENT)
Dept: LAB | Facility: CLINIC | Age: 47
End: 2022-04-27
Payer: COMMERCIAL

## 2022-04-27 DIAGNOSIS — D50.9 IRON DEFICIENCY ANEMIA, UNSPECIFIED IRON DEFICIENCY ANEMIA TYPE: ICD-10-CM

## 2022-04-27 DIAGNOSIS — Z83.49 FAMILY HISTORY OF THYROID DISEASE: ICD-10-CM

## 2022-04-27 DIAGNOSIS — E80.6 HYPERBILIRUBINEMIA: ICD-10-CM

## 2022-04-27 DIAGNOSIS — Z11.59 ENCOUNTER FOR HEPATITIS C SCREENING TEST FOR LOW RISK PATIENT: ICD-10-CM

## 2022-04-27 DIAGNOSIS — R73.03 PREDIABETES: ICD-10-CM

## 2022-04-27 LAB
BASOPHILS # BLD AUTO: 0.06 THOUSANDS/ΜL (ref 0–0.1)
BASOPHILS NFR BLD AUTO: 1 % (ref 0–1)
BILIRUB DIRECT SERPL-MCNC: 0.07 MG/DL (ref 0–0.2)
BILIRUB SERPL-MCNC: 0.38 MG/DL (ref 0.2–1)
EOSINOPHIL # BLD AUTO: 0.22 THOUSAND/ΜL (ref 0–0.61)
EOSINOPHIL NFR BLD AUTO: 3 % (ref 0–6)
ERYTHROCYTE [DISTWIDTH] IN BLOOD BY AUTOMATED COUNT: 13.4 % (ref 11.6–15.1)
EST. AVERAGE GLUCOSE BLD GHB EST-MCNC: 120 MG/DL
FERRITIN SERPL-MCNC: 42 NG/ML (ref 8–388)
HBA1C MFR BLD: 5.8 %
HCT VFR BLD AUTO: 44.1 % (ref 34.8–46.1)
HCV AB SER QL: NORMAL
HGB BLD-MCNC: 13.9 G/DL (ref 11.5–15.4)
IMM GRANULOCYTES # BLD AUTO: 0.03 THOUSAND/UL (ref 0–0.2)
IMM GRANULOCYTES NFR BLD AUTO: 0 % (ref 0–2)
IRON SATN MFR SERPL: 17 % (ref 15–50)
IRON SERPL-MCNC: 66 UG/DL (ref 50–170)
LYMPHOCYTES # BLD AUTO: 1.89 THOUSANDS/ΜL (ref 0.6–4.47)
LYMPHOCYTES NFR BLD AUTO: 26 % (ref 14–44)
MCH RBC QN AUTO: 30.1 PG (ref 26.8–34.3)
MCHC RBC AUTO-ENTMCNC: 31.5 G/DL (ref 31.4–37.4)
MCV RBC AUTO: 96 FL (ref 82–98)
MONOCYTES # BLD AUTO: 0.7 THOUSAND/ΜL (ref 0.17–1.22)
MONOCYTES NFR BLD AUTO: 10 % (ref 4–12)
NEUTROPHILS # BLD AUTO: 4.48 THOUSANDS/ΜL (ref 1.85–7.62)
NEUTS SEG NFR BLD AUTO: 60 % (ref 43–75)
NRBC BLD AUTO-RTO: 0 /100 WBCS
PLATELET # BLD AUTO: 330 THOUSANDS/UL (ref 149–390)
PMV BLD AUTO: 9.3 FL (ref 8.9–12.7)
RBC # BLD AUTO: 4.62 MILLION/UL (ref 3.81–5.12)
TIBC SERPL-MCNC: 384 UG/DL (ref 250–450)
TSH SERPL DL<=0.05 MIU/L-ACNC: 1.5 UIU/ML (ref 0.45–4.5)
WBC # BLD AUTO: 7.38 THOUSAND/UL (ref 4.31–10.16)

## 2022-04-27 PROCEDURE — 83540 ASSAY OF IRON: CPT

## 2022-04-27 PROCEDURE — 83550 IRON BINDING TEST: CPT

## 2022-04-27 PROCEDURE — 86803 HEPATITIS C AB TEST: CPT

## 2022-04-27 PROCEDURE — 36415 COLL VENOUS BLD VENIPUNCTURE: CPT

## 2022-04-27 PROCEDURE — 82248 BILIRUBIN DIRECT: CPT

## 2022-04-27 PROCEDURE — 82728 ASSAY OF FERRITIN: CPT

## 2022-04-27 PROCEDURE — 83036 HEMOGLOBIN GLYCOSYLATED A1C: CPT

## 2022-04-27 PROCEDURE — 82247 BILIRUBIN TOTAL: CPT

## 2022-04-27 PROCEDURE — 85025 COMPLETE CBC W/AUTO DIFF WBC: CPT

## 2022-04-27 PROCEDURE — 84443 ASSAY THYROID STIM HORMONE: CPT

## 2022-05-03 ENCOUNTER — OFFICE VISIT (OUTPATIENT)
Dept: FAMILY MEDICINE CLINIC | Facility: CLINIC | Age: 47
End: 2022-05-03
Payer: COMMERCIAL

## 2022-05-03 VITALS
TEMPERATURE: 97.6 F | DIASTOLIC BLOOD PRESSURE: 88 MMHG | BODY MASS INDEX: 28.82 KG/M2 | RESPIRATION RATE: 16 BRPM | HEART RATE: 75 BPM | OXYGEN SATURATION: 99 % | SYSTOLIC BLOOD PRESSURE: 136 MMHG | WEIGHT: 194.6 LBS | HEIGHT: 69 IN

## 2022-05-03 DIAGNOSIS — L21.9 SEBORRHEIC DERMATITIS: Primary | ICD-10-CM

## 2022-05-03 DIAGNOSIS — I10 BENIGN ESSENTIAL HTN: ICD-10-CM

## 2022-05-03 PROCEDURE — 99213 OFFICE O/P EST LOW 20 MIN: CPT | Performed by: FAMILY MEDICINE

## 2022-05-03 RX ORDER — KETOCONAZOLE 20 MG/G
CREAM TOPICAL 2 TIMES DAILY
Qty: 15 G | Refills: 0 | Status: SHIPPED | OUTPATIENT
Start: 2022-05-03 | End: 2022-05-17

## 2022-05-03 RX ORDER — LISINOPRIL 10 MG/1
10 TABLET ORAL DAILY
Qty: 30 TABLET | Refills: 2 | Status: SHIPPED | OUTPATIENT
Start: 2022-05-03

## 2022-05-03 NOTE — PROGRESS NOTES
Assessment/Plan:         Problem List Items Addressed This Visit        Musculoskeletal and Integument    Seborrheic dermatitis - Primary     Dry erythematous patches on the glabella and infraorbital furrow which resembles seborrheic dermatitis   Ordered ketoconazole 2% to apply twice a day for 2 weeks but may extend for 4 weeks  Referred to dermatology should the rash persist          Relevant Medications    ketoconazole (NIZORAL) 2 % cream       Other    Elevated blood pressure reading without diagnosis of hypertension     Diastolic blood pressure still elevated  Goal is less than 130/80  Will increase lisinopril to 10 mg daily  Experiencing less headaches since initiating antihypertensive medication   Will collect a urine microalbumin at the next visit         Relevant Medications    lisinopril (ZESTRIL) 10 mg tablet            Subjective:      Patient ID: Liban Hooper is a 42 y o  female here for follow up  BPs improving and ranging between 353-905'S sytolic and 86-77 diastolic  Headaches have also subsided  Reports a rash on the forehead and along the nasal bridge  Started in 2020  Comes and goes  Rash is pruritic, dry, and flakes  Schedule to see a dermatologist but not till July  Had been using cocoa butter  No other skin issues  The following portions of the patient's history were reviewed and updated as appropriate:   She  has a past medical history of Abnormal Pap smear of cervix, Allergic rhinitis, Anemia, Dizziness, Seasonal allergies, and Vertigo    She   Patient Active Problem List    Diagnosis Date Noted    Seborrheic dermatitis 05/04/2022    Hyperbilirubinemia 02/02/2022    Thoracogenic scoliosis of thoracolumbar region 02/02/2022    Family history of thyroid disease 02/02/2022    Prediabetes 02/02/2022    History of prediabetes 01/06/2022    Elevated blood pressure reading without diagnosis of hypertension 01/06/2022    Mass of right wrist 02/05/2021    Allergic rhinitis     Vertigo     IUD (intrauterine device) in place 03/16/2020    IUD check up 03/16/2020    Screening for malignant neoplasm of breast 01/28/2019    Abnormal bleeding in menstrual cycle 01/28/2019    Iron deficiency anemia 01/28/2019    Encounter for gynecological examination (general) (routine) without abnormal findings 01/28/2019     She  has a past surgical history that includes Tubal ligation; pr excis primary ganglion wrist (Right, 2/5/2021); ARTHROTOMY ANKLE (2/5/2021); Breast excisional biopsy (Left, 1994); and Gynecologic cryosurgery  Her family history includes Breast cancer (age of onset: 39) in her sister; Breast cancer (age of onset: 79) in her maternal grandmother; No Known Problems in her mother; Sarcoidosis in her father  She  reports that she has never smoked  She has never used smokeless tobacco  She reports that she does not drink alcohol and does not use drugs  Current Outpatient Medications on File Prior to Visit   Medication Sig    Diclofenac Sodium (VOLTAREN) 1 %     ergocalciferol (VITAMIN D2) 50,000 units TAKE 1 CAPSULE(S) EVERY WEEK BY ORAL ROUTE IN THE MORNING FOR 90 DAYS   ferrous gluconate (FERGON) 324 mg tablet TAKE 1 TABLET(S) EVERY DAY BY MOUTH BEFORE MEALS FOR 30 DAYS   levonorgestrel (MIRENA) 20 MCG/24HR IUD 1 each by Intrauterine route once     No current facility-administered medications on file prior to visit  She is allergic to shellfish-derived products - food allergy       Review of Systems   Cardiovascular: Negative for chest pain, palpitations and leg swelling  Skin: Positive for rash  Objective:      /88   Pulse 75   Temp 97 6 °F (36 4 °C) (Tympanic)   Resp 16   Ht 5' 9" (1 753 m)   Wt 88 3 kg (194 lb 9 6 oz)   SpO2 99%   BMI 28 74 kg/m²          Physical Exam  Vitals reviewed  Constitutional:       General: She is not in acute distress  Appearance: Normal appearance  She is not ill-appearing     HENT:      Head: Normocephalic and atraumatic  Cardiovascular:      Rate and Rhythm: Normal rate and regular rhythm  Heart sounds: No murmur heard  Pulmonary:      Effort: Pulmonary effort is normal  No respiratory distress  Breath sounds: Normal breath sounds  No stridor  No wheezing or rhonchi  Skin:     General: Skin is warm  Findings: Rash present  Comments: Macular papular dry rash between the eyebrows and on nasal bridge   Neurological:      General: No focal deficit present  Mental Status: She is alert and oriented to person, place, and time

## 2022-05-04 PROBLEM — L21.9 SEBORRHEIC DERMATITIS: Status: ACTIVE | Noted: 2022-05-04

## 2022-05-04 NOTE — ASSESSMENT & PLAN NOTE
Dry erythematous patches on the glabella and infraorbital furrow which resembles seborrheic dermatitis   Ordered ketoconazole 2% to apply twice a day for 2 weeks but may extend for 4 weeks     Referred to dermatology should the rash persist

## 2022-05-04 NOTE — ASSESSMENT & PLAN NOTE
Diastolic blood pressure still elevated    Goal is less than 130/80  Will increase lisinopril to 10 mg daily  Experiencing less headaches since initiating antihypertensive medication   Will collect a urine microalbumin at the next visit

## 2022-05-17 ENCOUNTER — OFFICE VISIT (OUTPATIENT)
Dept: OBGYN CLINIC | Facility: CLINIC | Age: 47
End: 2022-05-17
Payer: COMMERCIAL

## 2022-05-17 VITALS
DIASTOLIC BLOOD PRESSURE: 80 MMHG | SYSTOLIC BLOOD PRESSURE: 122 MMHG | HEIGHT: 68 IN | WEIGHT: 194.4 LBS | BODY MASS INDEX: 29.46 KG/M2

## 2022-05-17 DIAGNOSIS — Z30.431 IUD CHECK UP: ICD-10-CM

## 2022-05-17 DIAGNOSIS — N93.9 ABNORMAL UTERINE BLEEDING (AUB): Primary | ICD-10-CM

## 2022-05-17 PROCEDURE — 99213 OFFICE O/P EST LOW 20 MIN: CPT | Performed by: OBSTETRICS & GYNECOLOGY

## 2022-05-17 NOTE — PROGRESS NOTES
Assessment/Plan:    Will check pelvic US - IUD position and patient has history of polyps  Follow up based on results  Abnormal uterine bleeding (AUB)  -     US pelvis complete w transvaginal; Future    IUD check up  -     US pelvis complete w transvaginal; Future        Subjective:      Patient ID: Mauricio Barnes is a 42 y o  female  Katie Grounds presents today for a problem visit  Has Mirena (2/2020)  Random light bleeding - can last 7-10 days, no consistency in timing of when occurs  Has gotten consistent cycles with IUD - but had been more regular previously  Did have one cycle a few months ago that was a tiny bit heavier with clot  Denies new sexual partners  Monogamous  Hx cryo x 2  Hx polyps  Last Pap 2/22/22 - NILM, neg HPV  Recent TSH WNL    The following portions of the patient's history were reviewed and updated as appropriate: allergies, current medications and problem list     Review of Systems      Objective:      /80 (BP Location: Right arm, Patient Position: Sitting, Cuff Size: Large)   Ht 5' 7 5" (1 715 m)   Wt 88 2 kg (194 lb 6 4 oz)   BMI 30 00 kg/m²          Physical Exam  Vitals reviewed  Constitutional:       Appearance: Normal appearance  Genitourinary:     General: Normal vulva  Labia:         Right: No rash or lesion  Left: No rash or lesion  Vagina: Normal       Cervix: Normal       Uterus: Normal        Adnexa: Right adnexa normal and left adnexa normal          Neurological:      Mental Status: She is alert

## 2022-06-08 ENCOUNTER — HOSPITAL ENCOUNTER (OUTPATIENT)
Dept: ULTRASOUND IMAGING | Facility: HOSPITAL | Age: 47
Discharge: HOME | End: 2022-06-08
Attending: OBSTETRICS & GYNECOLOGY
Payer: COMMERCIAL

## 2022-06-08 DIAGNOSIS — Z30.431 IUD CHECK UP: ICD-10-CM

## 2022-06-08 DIAGNOSIS — N93.9 ABNORMAL UTERINE BLEEDING (AUB): ICD-10-CM

## 2022-06-08 PROCEDURE — 76830 TRANSVAGINAL US NON-OB: CPT

## 2022-06-08 PROCEDURE — 76856 US EXAM PELVIC COMPLETE: CPT

## 2022-06-20 ENCOUNTER — TELEPHONE (OUTPATIENT)
Dept: OBGYN CLINIC | Facility: CLINIC | Age: 47
End: 2022-06-20

## 2022-06-20 NOTE — TELEPHONE ENCOUNTER
----- Message from Arturo Helton MD sent at 6/19/2022 11:18 PM EDT -----  Pelvic US reviewed - IUD in proper location  Several small fibroids, no evidence of intra-uterine polyps  One small ovarian cyst which is benign and no specific follow up needed

## 2022-06-22 ENCOUNTER — CONSULT (OUTPATIENT)
Dept: MULTI SPECIALTY CLINIC | Facility: CLINIC | Age: 47
End: 2022-06-22

## 2022-06-22 DIAGNOSIS — L21.9 SEBORRHEIC DERMATITIS: Primary | ICD-10-CM

## 2022-06-22 DIAGNOSIS — L70.0 ACNE VULGARIS: ICD-10-CM

## 2022-06-22 PROCEDURE — 99242 OFF/OP CONSLTJ NEW/EST SF 20: CPT | Performed by: DERMATOLOGY

## 2022-06-22 RX ORDER — TRIAMCINOLONE ACETONIDE 1 MG/ML
LOTION TOPICAL
Qty: 60 ML | Refills: 0 | Status: SHIPPED | OUTPATIENT
Start: 2022-06-22

## 2022-06-22 RX ORDER — CICLOPIROX 1 G/100ML
SHAMPOO TOPICAL
Qty: 120 ML | Refills: 0 | Status: SHIPPED | OUTPATIENT
Start: 2022-06-22

## 2022-06-22 NOTE — PROGRESS NOTES
[facility] Note     Patient Name: Alec Michele  Encounter Date: 6/22/22     Have you been cared for by a [facility] Dermatologist in the last 3 years and, if so, which one? No    · Have you traveled outside of the [address] [place] [address] in the past 3 months or outside of the [facility] area in the last 2 weeks? No     May we call your Preferred Phone number to discuss your specific medical information? Yes     May we leave a detailed message that includes your specific medical information? Yes      Today's Chief Concerns:   Concern #1:  Itchy scalp and eyebrows   Concern #2:  Spots on lips     Past Medical History:  Have you personally ever had or currently have any of the following? · Skin cancer (such as Melanoma, Basal Cell Carcinoma, Squamous Cell Carcinoma? (If Yes, please provide more detail)- No  · Eczema: No  · Psoriasis: No  · HIV/AIDS: No  · Hepatitis B or C: No  · Tuberculosis: No  · Systemic Immunosuppression such as Diabetes, Biologic or Immunotherapy, Chemotherapy, Organ Transplantation, Bone Marrow Transplantation (If YES, please provide more detail): No  · Radiation Treatment (If YES, please provide more detail): No  · Any other major medical conditions/concerns? 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 No  · Eczema, Asthma, Hay Fever or Seasonal Allergies: No  · Psoriasis or Psoriatic Arthritis: No  · Do any other medical conditions seem to run in your family? If Yes, what condition and which relatives?   No    Current Medications:   (please update all dermatological medications before printing patient's AVS!)      Current Outpatient Medications:     Diclofenac Sodium (VOLTAREN) 1 %, , Disp: , Rfl:     ergocalciferol (VITAMIN D2) 50,000 units, TAKE 1 CAPSULE(S) EVERY WEEK BY ORAL ROUTE IN THE MORNING FOR 90 DAYS , Disp: , Rfl:     ferrous gluconate (FERGON) 324 mg tablet, TAKE 1 TABLET(S) EVERY DAY BY MOUTH BEFORE MEALS FOR 30 DAYS , Disp: , Rfl:     ketoconazole (NIZORAL) 2 % cream, Apply topically 2 (two) times a day for 14 days, Disp: 15 g, Rfl: 0    levonorgestrel (MIRENA) 20 MCG/24HR IUD, 1 each by Intrauterine route once, Disp: , Rfl:     lisinopril (ZESTRIL) 10 mg tablet, Take 1 tablet (10 mg total) by mouth daily, Disp: 30 tablet, Rfl: 2      Review of Systems:  Have you recently had or currently have any of the following? If YES, what are you doing for the problem? · Fever, chills or unintended weight loss: No  · Sudden loss or change in your vision: No  · Nausea, vomiting or blood in your stool: No  · Painful or swollen joints: No  · Wheezing or cough: No  · Changing mole or non-healing wound: No  · Nosebleeds: No  · Excessive sweating: No  · Easy or prolonged bleeding? No  · Over the last 2 weeks, how often have you been bothered by the following problems? · Taking little interest or pleasure in doing things: 1 - Not at All  · Feeling down, depressed, or hopeless: 1 - Not at All  · Rapid heartbeat with epinephrine:  No    · FEMALES ONLY:    · Are you pregnant or planning to become pregnant? No  · Are you currently or planning to be nursing or breast feeding? No    · Any known allergies? Allergies   Allergen Reactions    Shellfish-Derived Products - Food Allergy Throat Swelling   ·       Physical Exam:     Was a chaperone (Derm Clinical Assistant) present throughout the entire Physical Exam? Yes     Did the Dermatology Team specifically  the patient on the importance of a Full Skin Exam to be sure that nothing is missed clinically?  Yes}  o Did the patient ultimately request or accept a Full Skin Exam?  NO    CONSTITUTIONAL:   There were no vitals filed for this visit     [facility]: Normal mood and affect  EYES: Normal conjunctiva  ENT: Normal lips and oral mucosa  CARDIOVASCULAR: No edema  RESPIRATORY: Normal respirations  HEME/LYMPH/IMMUNO:  No regional lymphadenopathy except as noted below in "ASSESSMENT AND PLAN BY DIAGNOSIS"    SKIN:  FULL ORGAN SYSTEM EXAM   Hair, Scalp, Ears, Face Normal except as noted below in Assessment   Neck, Cervical Chain Nodes Normal except as noted below in Assessment        Assessment and Plan by Diagnosis:    History of Present Condition:     Duration:  How long has this been an issue for you? o  several months    Location Affected:  Where on the body is this affecting you?    o  eyebrows, midface, and scalp   Quality:  Is there any bleeding, pain, itch, burning/irritation, or redness associated with the skin lesion? o  itching   Severity:  Describe any bleeding, pain, itch, burning/irritation, or redness on a scale of 1 to 10 (with 10 being the worst)  o  7/10   Timing:  Does this condition seem to be there pretty constantly or do you notice it more at specific times throughout the day?    o  flares but somewhat constant    Context:  Have you ever noticed that this condition seems to be associated with specific activities you do?    o  no   Modifying Factors:    o Anything that seems to make the condition worse?    -  no  o What have you tried to do to make the condition better?    -  cocoa butter    Associated Signs and Symptoms:  Does this skin lesion seem to be associated with any of the following:  o  SL AMB DERM SIGNS AND SYMPTOMS: Skin color changes     SEBORRHEIC DERMATITIS    Physical Exam:   Anatomic Location Affected:  Glabella, eyelashes, midface    Morphological Description:  Scaly yellow greasy patches 3   Pertinent Positives:   Pertinent Negatives:     Additional History of Present Condition:  Present on exam      Assessment and Plan:  Based on a thorough discussion of this condition and the management approach to it (including a comprehensive discussion of the known risks, side effects and potential benefits of treatment), the patient (family) agrees to implement the following specific plan:    Ciclopirox 1% shampoo  Use Monday, Wednesday Friday  Lather into scalp and face  Wash off completely after 5 minutes   Triamcinolone 0 1% lotion  Apply to the scalp as needed  Do not exceed 14 days of use as this can thin the skin   Please apply hydrocortisone 2 5% ointment to the glabella, eyebrows for 14 days once daily  Seborrheic Dermatitis   Seborrheic dermatitis is a common, chronic or relapsing form of eczema/dermatitis that mainly affects the sebaceous, gland-rich regions of the scalp, face, and trunk  There are infantile and adult forms of seborrhoeic dermatitis  It is sometimes associated with psoriasis and, in that clinical scenario, may be referred to as "sebo-psoriasis "  Seborrheic dermatitis is also known as "seborrheic eczema "  Dandruff (also called "pityriasis capitis") is an uninflamed form of seborrhoeic dermatitis  Dandruff presents as bran-like scaly patches scattered within hair-bearing areas of the scalp  In an infant, this condition may be referred to as "cradle cap "  The cause of seborrheic dermatitis is not completely understood  It is associated with proliferation of various species of the skin commensal Malassezia, in its yeast (non-pathogenic) form  Its metabolites (such as the fatty acids oleic acid, malssezin, and indole-3-carbaldehyde) may cause an inflammatory reaction  Differences in skin barrier lipid content and function may account for individual presentations  Infantile Seborrheic Dermatitis  Infantile seborrheic dermatitis affects babies under the age of 1 months and usually resolves by 1012 months of age  Infantile seborrheic dermatitis causes "cradle cap" (diffuse, greasy scaling on scalp)   The rash may spread to affect armpit and groin folds (a type of "napkin dermatitis")  There may be associated salmon-pink colored patches that may flake or peel  The rash in this case is usually not especially itchy, so the baby often appears undisturbed by the rash, even when more generalized  Adult Seborrheic Dermatitis  Adult seborrheic dermatitis tends to begin in late adolescence; prevalence is greatest in young adults and in the elderly  It is more common in males than in females  The following factors are sometimes associated with severe adult seborrheic dermatitis:   Oily skin   Familial tendency to seborrhoeic dermatitis or a family history of psoriasis   Immunosuppression: organ transplant recipient, human immunodeficiency virus (HIV) infection and patients with lymphoma   Neurological and psychiatric diseases: Parkinson disease, tardive dyskinesia, depression, epilepsy, facial nerve palsy, spinal cord injury and congenital disorders such as Down syndrome   Treatment for psoriasis with psoralen and ultraviolet A (PUVA) therapy   Lack of sleep   Stressful events  In adults, seborrheic dermatitis may typically affect the scalp, face (creases around the nose, behind ears, within eyebrows) and upper trunk  Typical clinical features include:   Winter flares, improving in summer following sun exposure   Minimal itch most of the time   Combination oily and dry mid-facial skin   Ill-defined localized scaly patches or diffuse scale in the scalp   Blepharitis; scaly red eyelid margins   [place]-pink, thin, scaly, and ill-defined plaques in skin folds on both sides of the face   Petal or ring-shaped flaky patches on hair-line and on anterior chest   Rash in armpits, under the breasts, in the groin folds and genital creases   Superficial folliculitis (inflamed hair follicles) on cheeks and upper trunk    Seborrheic dermatitis is diagnosed by its clinical appearance and behavior   Skin biopsy may be helpful but is rarely necessary to make this diagnosis  POST-INFLAMMATORY HYPERPIGMENTATION ("PIH")    Physical Exam:   Anatomic Location Affected:   Upper vermillion border of lips   Morphological Description:  Erythematous scaly patches with some scatter hyperpigmentation    Pertinent Positives:   Pertinent Negatives: Additional History of Present Condition:  Present on exam      Assessment and Plan:  Based on a thorough discussion of this condition and the management approach to it (including a comprehensive discussion of the known risks, side effects and potential benefits of treatment), the patient (family) agrees to implement the following specific plan:   Please apply hydrocortisone 2 5% ointment to the upper lip once daily for up to 14 days  Assessment and Plan:Assessment and Plan  Post-inflammatory hyperpigmentation (PIH) is a temporary darkening of the skin that follows injury such as a cut or burn, or inflammation of the skin following an infection or rash  It can occur in anyone, but most commonly affects darker skin types with greater frequency and severity  Many types of inflammatory skin diseases and injuries can cause PIH, but the most common ones are acne vulgaris, atopic dermatitis, and impetigo  It is due to an overproduction of melanin and uneven transfer of pigment to surrounding keratinocytes  The exact mechanism is unknown, but it is shown to be stimulated by prostanoids, cytokines, chemokines, and other inflammatory mediators  Some medications may also darken postinflammatory pigmentation such as antimalarial drugs, clofazimine, tetracycline, anticancer drugs such as bleomycin, doxorubicin, 5-fluorouracil and busulfan  Postinflammatory hyperpigmented patches are located at the site of original inflammation after the original condition has healed or resolved  They range from light brown to black in color and may become darker if exposed to sunlight and other sources of UV rays   Hyperpigmentation in the dermis has blue-grey appearance and may be permanent or resolve over a protracted period of time if left untreated  The management of PIH should begin by first addressing the underlying inflammatory skin condition  It is important to be mindful that the treatment itself may exacerbate PIH by causing irritation   Treatments include the following:    At least three times a day application of SPF 68+ broad-spectrum sunscreen    Topical depigmenting agents such as hydroxyquinone, azelic acid, vitamin C cream, corticosteroid cream, kojic acid, licorice extract, and retinoids    Chemical peels   Laser treatments and intense pulsed light therapies for epidermal pigmentation can be used with higher risk of aggravating hyperpigmentation

## 2022-10-07 DIAGNOSIS — I10 BENIGN ESSENTIAL HTN: ICD-10-CM

## 2022-10-07 RX ORDER — LISINOPRIL 10 MG/1
TABLET ORAL
Qty: 90 TABLET | Refills: 0 | Status: SHIPPED | OUTPATIENT
Start: 2022-10-07

## 2022-11-03 ENCOUNTER — OFFICE VISIT (OUTPATIENT)
Dept: FAMILY MEDICINE CLINIC | Facility: CLINIC | Age: 47
End: 2022-11-03

## 2022-11-03 VITALS
HEART RATE: 70 BPM | HEIGHT: 68 IN | SYSTOLIC BLOOD PRESSURE: 132 MMHG | WEIGHT: 188.4 LBS | TEMPERATURE: 98.5 F | BODY MASS INDEX: 28.55 KG/M2 | RESPIRATION RATE: 16 BRPM | DIASTOLIC BLOOD PRESSURE: 90 MMHG | OXYGEN SATURATION: 97 %

## 2022-11-03 DIAGNOSIS — R20.0 NUMBNESS OF LEFT HAND: ICD-10-CM

## 2022-11-03 DIAGNOSIS — I10 BENIGN ESSENTIAL HTN: ICD-10-CM

## 2022-11-03 DIAGNOSIS — Z12.11 SCREENING FOR COLON CANCER: ICD-10-CM

## 2022-11-03 DIAGNOSIS — Z87.898 HISTORY OF PREDIABETES: ICD-10-CM

## 2022-11-03 DIAGNOSIS — L21.9 SEBORRHEIC DERMATITIS: ICD-10-CM

## 2022-11-03 DIAGNOSIS — I10 PRIMARY HYPERTENSION: Primary | ICD-10-CM

## 2022-11-03 LAB
CREAT UR-MCNC: 67.6 MG/DL
MICROALBUMIN UR-MCNC: <5 MG/L (ref 0–20)
MICROALBUMIN/CREAT 24H UR: <7 MG/G CREATININE (ref 0–30)
SL AMB POCT HEMOGLOBIN AIC: 5.7 (ref ?–6.5)

## 2022-11-04 PROBLEM — R20.0 NUMBNESS OF LEFT HAND: Status: ACTIVE | Noted: 2022-11-04

## 2022-11-04 RX ORDER — LISINOPRIL 20 MG/1
20 TABLET ORAL DAILY
Start: 2022-11-04

## 2022-11-04 NOTE — ASSESSMENT & PLAN NOTE
Recently diagnosed and was found to have elevated diastolic blood pressures  BP today 132/90, not at goal of less than 130/80  Ambulatory blood pressures were also also high   Will increase lisinopril from 10 mg to 20 mg daily  Urine microalbumin collected

## 2022-11-04 NOTE — PROGRESS NOTES
Name: Rhonda Farrell      : 1975      MRN: [id number]  Encounter Provider: Jessica Woodard MD  Encounter Date: 11/3/2022   Encounter department: [facility]     1  Primary hypertension  Assessment & Plan:  Recently diagnosed and was found to have elevated diastolic blood pressures  BP today 132/90, not at goal of less than 130/80  Ambulatory blood pressures were also also high   Will increase lisinopril from 10 mg to 20 mg daily  Urine microalbumin collected    Orders:  -     Microalbumin / creatinine urine ratio    2  Screening for colon cancer  -     Cologualeo    3  History of prediabetes  Assessment & Plan:  A1c 5 7, down from 6! Also lost 6 lbs   Recommend the patient follow a low fat, low cholesterol diet, limiting refined carbohydrates like white bread, white rice, white pasta, chips/pretzels, sweets/desserts, and sugary beverages  Increase fruits/vegetables  Increase exercise as tolerated      Orders:  -     POCT hemoglobin A1c    4  Seborrheic dermatitis  Assessment & Plan:  History of seborrheic dermatitis  Was prescribed ketoconazole at the last appointment  No significant improvement with antifungal treatment      5  Numbness of left hand  Assessment & Plan:  Episodic left hand numbness without weakness or tingling  Denies neck pain, elbow pain, or back pain  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   Strength intact  Recommend working in a station that would allow better positioning of the wrist        6  Benign essential HTN  -     lisinopril (ZESTRIL) 20 mg tablet; Take 1 tablet (20 mg total) by mouth daily      BMI Counseling: Body mass index is 29 07 kg/m²  The BMI is above normal  Nutrition recommendations include encouraging healthy choices of fruits and vegetables, limiting drinks that contain sugar, moderation in carbohydrate intake, increasing intake of lean protein, reducing intake of saturated and trans fat and reducing intake of cholesterol  Exercise recommendations include moderate physical activity 150 minutes/week  No pharmacotherapy was ordered  Patient referred to PCP  Rationale for BMI follow-up plan is due to patient being overweight or obese  Received her flu shot through her employer  Will update the system  Subjective      Here for follow-up  Doing well  Does complain of left hand numbness  History of carpal tunnel syndrome status post release in the right hand  Does not a lot time typing her car  Works a visiting nurse  No neck pain, weakness, swelling, or redness  No other complaints  Ambulatory pressures have been slightly elevated  Diastolic pressures in the 80-90's  Denies lightheadedness, or chest pain, palpitations, or or visual changes  Review of Systems   Musculoskeletal: Negative for back pain, neck pain and neck stiffness  Skin: Negative for rash  Neurological: Positive for numbness (Left hand)  Negative for weakness  Current Outpatient Medications on File Prior to Visit   Medication Sig   • Ciclopirox 1 % shampoo Please lather into scalp [id number]  • Diclofenac Sodium (VOLTAREN) 1 %    • ergocalciferol (VITAMIN D2) 50,000 units TAKE 1 CAPSULE(S) EVERY WEEK BY ORAL ROUTE IN THE MORNING FOR 90 DAYS  • ferrous gluconate (FERGON) 324 mg tablet TAKE 1 TABLET(S) EVERY DAY BY MOUTH BEFORE MEALS FOR 30 DAYS  • hydrocortisone 2 5 % ointment Apply daily to eyebrows and upper lips daily for up to 14 days  • levonorgestrel (MIRENA) 20 MCG/24HR IUD 1 each by Intrauterine route once   • triamcinolone (KENALOG) 0 1 % lotion Apply into scalp when having flare  Do not use more than 10-14 days     • [DISCONTINUED] lisinopril (ZESTRIL) 10 mg tablet TAKE ONE TABLET BY MOUTH EVERY DAY   • ketoconazole (NIZORAL) 2 % cream Apply topically 2 (two) times a day for 14 days       Objective     /90 (BP Location: Left arm)   Pulse 70   Temp 98 5 °F (36 9 °C)   Resp 16   Ht 5' 7 5" (1 715 m)   Wt 85 5 kg (188 lb 6 4 oz)   SpO2 97%   BMI 29 07 kg/m²     Physical Exam  Vitals reviewed  Constitutional:       General: She is not in acute distress  Appearance: Normal appearance  She is not ill-appearing  HENT:      Head: Atraumatic  Right Ear: Tympanic membrane normal       Left Ear: Tympanic membrane normal    Eyes:      Extraocular Movements: Extraocular movements intact  Cardiovascular:      Rate and Rhythm: Normal rate and regular rhythm  Heart sounds: No murmur heard  Pulmonary:      Effort: Pulmonary effort is normal       Breath sounds: Normal breath sounds  Abdominal:      General: Abdomen is flat  There is no distension  Palpations: Abdomen is soft  There is no mass  Tenderness: There is no abdominal tenderness  There is no guarding  Musculoskeletal:      Right lower leg: No edema  Left lower leg: No edema  Lymphadenopathy:      Cervical: No cervical adenopathy  Skin:     General: Skin is warm  Neurological:      Mental Status: She is alert and oriented to person, place, and time  Psychiatric:         Mood and Affect: Mood normal          Behavior: Behavior normal          Thought Content:  Thought content normal        Dimitris Hooks MD

## 2022-11-04 NOTE — ASSESSMENT & PLAN NOTE
A1c 5 7, down from 6! Also lost 6 lbs   Recommend the patient follow a low fat, low cholesterol diet, limiting refined carbohydrates like white bread, white rice, white pasta, chips/pretzels, sweets/desserts, and sugary beverages  Increase fruits/vegetables   Increase exercise as tolerated

## 2022-11-04 NOTE — ASSESSMENT & PLAN NOTE
History of seborrheic dermatitis  Was prescribed ketoconazole at the last appointment  No significant improvement with antifungal treatment

## 2022-11-04 NOTE — ASSESSMENT & PLAN NOTE
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

## 2022-12-15 DIAGNOSIS — I10 PRIMARY HYPERTENSION: Primary | ICD-10-CM

## 2022-12-15 DIAGNOSIS — I10 BENIGN ESSENTIAL HTN: ICD-10-CM

## 2022-12-15 RX ORDER — LISINOPRIL 10 MG/1
TABLET ORAL
Qty: 90 TABLET | Refills: 0 | Status: SHIPPED | OUTPATIENT
Start: 2022-12-15

## 2022-12-15 NOTE — TELEPHONE ENCOUNTER
Medication Refill Request     Name lisinopril (ZESTRIL) 20 mg tablet  Dose/Frequency daily  Quantity unknown  Verified pharmacy   [x]  Verified ordering Provider   [x]  Does patient have enough for the next 3 days?  Yes [] No [x]

## 2022-12-16 RX ORDER — LISINOPRIL 20 MG/1
20 TABLET ORAL DAILY
Qty: 90 TABLET | Refills: 0 | Status: SHIPPED | OUTPATIENT
Start: 2022-12-16

## 2023-01-03 LAB — COLOGUARD RESULT REPORTABLE: NEGATIVE

## 2023-02-07 ENCOUNTER — ANNUAL EXAM (OUTPATIENT)
Dept: OBGYN CLINIC | Facility: CLINIC | Age: 48
End: 2023-02-07

## 2023-02-07 VITALS
WEIGHT: 191.4 LBS | HEIGHT: 68 IN | BODY MASS INDEX: 29.01 KG/M2 | DIASTOLIC BLOOD PRESSURE: 64 MMHG | SYSTOLIC BLOOD PRESSURE: 124 MMHG

## 2023-02-07 DIAGNOSIS — Z01.419 ENCOUNTER FOR ANNUAL ROUTINE GYNECOLOGICAL EXAMINATION: Primary | ICD-10-CM

## 2023-02-07 DIAGNOSIS — Z97.5 IUD (INTRAUTERINE DEVICE) IN PLACE: ICD-10-CM

## 2023-02-07 PROBLEM — Z30.431 IUD CHECK UP: Status: RESOLVED | Noted: 2020-03-16 | Resolved: 2023-02-07

## 2023-02-07 NOTE — PROGRESS NOTES
Assessment/Plan :    1  Encounter for annual routine gynecological examination      2  IUD (intrauterine device) in place          25 Martha Whelan is a 41 y o  female who presents for annual exam  Periods are regular every 28 days, lasting 12 days  Dysmenorrhea:mild, occurring first 1-2 days of flow  Cyclic symptoms:  none  No intermenstrual bleeding, spotting, or discharge  The patient reports persistent vaginal discharge without color or odor  Current contraception: IUD  History of abnormal Pap smear: yes  Regular self breast exam: yes  History of abnormal mammogram: no  History of abnormal lipids: no    Menstrual History:  OB History        4    Para   3    Term   3            AB   1    Living   3       SAB        IAB        Ectopic        Multiple        Live Births   2                Patient's last menstrual period was 2023 (approximate)  Period Cycle (Days):  (28)  Period Duration (Days): 12  Period Pattern: Regular  Menstrual Flow: Light  Menstrual Control: Panty liner  Dysmenorrhea: (!) Mild  Dysmenorrhea Symptoms: Cramping    Past Medical History:   Diagnosis Date   • Abnormal Pap smear of cervix    • Allergic rhinitis    • Anemia    • Dizziness    • Seasonal allergies    • Vertigo        Family History   Problem Relation Age of Onset   • No Known Problems Mother    • Sarcoidosis Father    • Breast cancer Sister 39   • Breast cancer Maternal Grandmother 79       The following portions of the patient's history were reviewed and updated as appropriate: allergies, current medications, past family history, past medical history, past social history, past surgical history and problem list     Review of Systems  Pertinent items are noted in HPI        Objective      /64 (BP Location: Right arm, Patient Position: Sitting, Cuff Size: Large)   Ht 5' 8" (1 727 m)   Wt 86 8 kg (191 lb 6 4 oz)   LMP 2023 (Approximate)   BMI 29 10 kg/m²     General:   alert and oriented, in no acute distress   Heart:  Breasts: regular rate and rhythm   appear normal, no suspicious masses, no skin or nipple changes or axillary nodes     Lungs: effort normal   Abdomen: soft, non-tender, without masses or organomegaly   Vulva: normal   Vagina: normal mucosa   Cervix: no lesions and IUD strings   Uterus: normal size, mobile, non-tender   Adnexa: normal adnexa and no mass, fullness, tenderness

## 2023-04-01 DIAGNOSIS — I10 BENIGN ESSENTIAL HTN: ICD-10-CM

## 2023-04-03 RX ORDER — LISINOPRIL 20 MG/1
20 TABLET ORAL DAILY
Qty: 90 TABLET | Refills: 3 | Status: SHIPPED | OUTPATIENT
Start: 2023-04-03

## 2023-05-01 ENCOUNTER — TELEPHONE (OUTPATIENT)
Dept: FAMILY MEDICINE CLINIC | Facility: CLINIC | Age: 48
End: 2023-05-01

## 2023-05-01 DIAGNOSIS — E55.9 VITAMIN D INSUFFICIENCY: ICD-10-CM

## 2023-05-01 DIAGNOSIS — I10 PRIMARY HYPERTENSION: ICD-10-CM

## 2023-05-01 DIAGNOSIS — Z87.898 HISTORY OF PREDIABETES: Primary | ICD-10-CM

## 2023-05-01 DIAGNOSIS — Z13.220 SCREENING, LIPID: ICD-10-CM

## 2023-05-01 NOTE — TELEPHONE ENCOUNTER
Pt has appt on 5/3 and asked if she needed any labs done   Didn't see pending orders in chart    Please advise

## 2023-05-03 ENCOUNTER — OFFICE VISIT (OUTPATIENT)
Dept: FAMILY MEDICINE CLINIC | Facility: CLINIC | Age: 48
End: 2023-05-03

## 2023-05-03 ENCOUNTER — LAB (OUTPATIENT)
Dept: LAB | Facility: CLINIC | Age: 48
End: 2023-05-03

## 2023-05-03 VITALS
DIASTOLIC BLOOD PRESSURE: 78 MMHG | RESPIRATION RATE: 16 BRPM | OXYGEN SATURATION: 98 % | TEMPERATURE: 96.9 F | BODY MASS INDEX: 29.71 KG/M2 | WEIGHT: 195.4 LBS | HEART RATE: 79 BPM | SYSTOLIC BLOOD PRESSURE: 130 MMHG

## 2023-05-03 DIAGNOSIS — Z87.898 HISTORY OF PREDIABETES: ICD-10-CM

## 2023-05-03 DIAGNOSIS — I10 PRIMARY HYPERTENSION: ICD-10-CM

## 2023-05-03 DIAGNOSIS — E80.6 HYPERBILIRUBINEMIA: ICD-10-CM

## 2023-05-03 DIAGNOSIS — R20.0 NUMBNESS OF LEFT HAND: ICD-10-CM

## 2023-05-03 DIAGNOSIS — Z13.220 SCREENING, LIPID: ICD-10-CM

## 2023-05-03 DIAGNOSIS — E55.9 VITAMIN D INSUFFICIENCY: ICD-10-CM

## 2023-05-03 DIAGNOSIS — E55.9 VITAMIN D INSUFFICIENCY: Primary | ICD-10-CM

## 2023-05-03 LAB
25(OH)D3 SERPL-MCNC: 18.2 NG/ML (ref 30–100)
ALBUMIN SERPL BCP-MCNC: 3.9 G/DL (ref 3.5–5)
ALP SERPL-CCNC: 59 U/L (ref 46–116)
ALT SERPL W P-5'-P-CCNC: 28 U/L (ref 12–78)
ANION GAP SERPL CALCULATED.3IONS-SCNC: 1 MMOL/L (ref 4–13)
AST SERPL W P-5'-P-CCNC: 18 U/L (ref 5–45)
BILIRUB SERPL-MCNC: 0.38 MG/DL (ref 0.2–1)
BUN SERPL-MCNC: 13 MG/DL (ref 5–25)
CALCIUM SERPL-MCNC: 9.6 MG/DL (ref 8.3–10.1)
CHLORIDE SERPL-SCNC: 108 MMOL/L (ref 96–108)
CHOLEST SERPL-MCNC: 179 MG/DL
CO2 SERPL-SCNC: 29 MMOL/L (ref 21–32)
CREAT SERPL-MCNC: 0.96 MG/DL (ref 0.6–1.3)
GFR SERPL CREATININE-BSD FRML MDRD: 71 ML/MIN/1.73SQ M
GLUCOSE P FAST SERPL-MCNC: 98 MG/DL (ref 65–99)
HDLC SERPL-MCNC: 54 MG/DL
LDLC SERPL CALC-MCNC: 99 MG/DL (ref 0–100)
NONHDLC SERPL-MCNC: 125 MG/DL
POTASSIUM SERPL-SCNC: 3.9 MMOL/L (ref 3.5–5.3)
PROT SERPL-MCNC: 7.8 G/DL (ref 6.4–8.4)
SODIUM SERPL-SCNC: 138 MMOL/L (ref 135–147)
TRIGL SERPL-MCNC: 130 MG/DL

## 2023-05-03 RX ORDER — ERGOCALCIFEROL 1.25 MG/1
50000 CAPSULE ORAL WEEKLY
Qty: 8 CAPSULE | Refills: 0 | Status: SHIPPED | OUTPATIENT
Start: 2023-05-03 | End: 2023-05-03

## 2023-05-03 RX ORDER — ERGOCALCIFEROL 1.25 MG/1
50000 CAPSULE ORAL WEEKLY
Qty: 8 CAPSULE | Refills: 0 | Status: SHIPPED | OUTPATIENT
Start: 2023-05-03 | End: 2023-06-22

## 2023-05-03 NOTE — PROGRESS NOTES
Name: Johann Ventura      : 1975      MRN: [id number]  Encounter Provider: Fede Macias MD  Encounter Date: 5/3/2023   Encounter department: [place] [address]  Vitamin D insufficiency  Comments:  Vit D 18  Tool high dose Vit D for 8 weeks and is taking low maintenance dose ( unsure the dose)  Will do 50K weekly for another 8 weeks then take 1000 iu daily  Orders:  -     ergocalciferol (VITAMIN D2) 50,000 units; Take 1 capsule (50,000 Units total) by mouth once a week for 8 doses    2  History of prediabetes  Assessment & Plan:  Last A1c 5 8   Repeat lab pending  FBS 98       3  Primary hypertension  Comments:  Bp at goal on lisinopril 20 mg daily     4  Numbness of left hand  Comments:  Resolved     5  Hyperbilirubinemia  Assessment & Plan:  Resolved              Subjective      Doing well  No acute concerns  Recently visited [address] [place] [address]  Hand numbness resolved  Review of Systems    Current Outpatient Medications on File Prior to Visit   Medication Sig    levonorgestrel (MIRENA) 20 MCG/24HR IUD 1 each by Intrauterine route once    lisinopril (ZESTRIL) 20 mg tablet Take 1 tablet (20 mg total) by mouth daily    [DISCONTINUED] ergocalciferol (VITAMIN D2) 50,000 units TAKE 1 CAPSULE(S) EVERY WEEK BY ORAL ROUTE IN THE MORNING FOR 90 DAYS   [DISCONTINUED] ferrous gluconate (FERGON) 324 mg tablet TAKE 1 TABLET(S) EVERY DAY BY MOUTH BEFORE MEALS FOR 30 DAYS  Objective     /78   Pulse 79   Temp (!) 96 9 °F (36 1 °C)   Resp 16   Wt 88 6 kg (195 lb 6 4 oz)   SpO2 98%   BMI 29 71 kg/m²     Physical Exam  Vitals reviewed  Constitutional:       General: She is not in acute distress  Appearance: Normal appearance  She is not ill-appearing or toxic-appearing  HENT:      Head: Normocephalic and atraumatic        Right Ear: Tympanic membrane normal       Left Ear: Tympanic membrane normal       Mouth/Throat:      Mouth: Mucous membranes are moist  Eyes:      Extraocular Movements: Extraocular movements intact  Cardiovascular:      Rate and Rhythm: Normal rate and regular rhythm  Pulmonary:      Effort: Pulmonary effort is normal       Breath sounds: Normal breath sounds  Lymphadenopathy:      Cervical: No cervical adenopathy  Skin:     General: Skin is warm  Neurological:      Mental Status: She is alert and oriented to person, place, and time  Psychiatric:         Mood and Affect: Mood normal          Behavior: Behavior normal          Thought Content:  Thought content normal        Lucero Lovett MD

## 2023-05-05 LAB
EST. AVERAGE GLUCOSE BLD GHB EST-MCNC: 117 MG/DL
HBA1C MFR BLD: 5.7 %

## 2023-05-10 ENCOUNTER — TELEPHONE (OUTPATIENT)
Dept: OBGYN CLINIC | Facility: CLINIC | Age: 48
End: 2023-05-10

## 2023-05-10 DIAGNOSIS — D21.9 FIBROIDS: Primary | ICD-10-CM

## 2023-05-10 NOTE — TELEPHONE ENCOUNTER
Pt has told you in the past that she gets period (light)every month  Now, this past month , she is having spotting off and on the entire month  She can feel the strings - still there  Any f/u?

## 2023-05-23 ENCOUNTER — HOSPITAL ENCOUNTER (OUTPATIENT)
Dept: RADIOLOGY | Age: 48
Discharge: HOME | End: 2023-05-23

## 2023-05-23 VITALS — BODY MASS INDEX: 29.86 KG/M2 | WEIGHT: 197 LBS | HEIGHT: 68 IN

## 2023-05-23 DIAGNOSIS — Z12.31 ENCOUNTER FOR SCREENING MAMMOGRAM FOR MALIGNANT NEOPLASM OF BREAST: ICD-10-CM

## 2023-05-26 ENCOUNTER — HOSPITAL ENCOUNTER (OUTPATIENT)
Dept: ULTRASOUND IMAGING | Facility: HOSPITAL | Age: 48
Discharge: HOME | End: 2023-05-26
Attending: OBSTETRICS & GYNECOLOGY

## 2023-05-26 DIAGNOSIS — D21.9 FIBROIDS: ICD-10-CM

## 2023-06-01 ENCOUNTER — TELEPHONE (OUTPATIENT)
Dept: OBGYN CLINIC | Facility: CLINIC | Age: 48
End: 2023-06-01

## 2023-06-01 NOTE — TELEPHONE ENCOUNTER
Per comm consent lm to pt with results and recommendations of US from [address]   Reviewed options- let us know what she thinks

## 2023-06-01 NOTE — TELEPHONE ENCOUNTER
----- Message from Thiago Belcher MD sent at 6/1/2023  9:13 AM EDT -----  Please tell patient that IUD is in correct location but her fibroids are right up against it - so they are causing her erratic bleeding  Options are to continue to watch, try a new Mirena or hysterectomy  If she goes without an IUD her bleeding will only worsen

## 2023-06-02 NOTE — TELEPHONE ENCOUNTER
Spoke to patient and if he gets a new mirena wouldn't she had the same issue with it being against fibroid      Not interested in hysterectomy

## 2023-07-22 DIAGNOSIS — I10 BENIGN ESSENTIAL HTN: ICD-10-CM

## 2023-07-24 RX ORDER — LISINOPRIL 20 MG/1
20 TABLET ORAL DAILY
Qty: 90 TABLET | Refills: 0 | Status: SHIPPED | OUTPATIENT
Start: 2023-07-24

## 2023-08-03 ENCOUNTER — OFFICE VISIT (OUTPATIENT)
Dept: FAMILY MEDICINE CLINIC | Facility: CLINIC | Age: 48
End: 2023-08-03
Payer: COMMERCIAL

## 2023-08-03 VITALS
SYSTOLIC BLOOD PRESSURE: 122 MMHG | RESPIRATION RATE: 18 BRPM | HEART RATE: 70 BPM | BODY MASS INDEX: 29.73 KG/M2 | OXYGEN SATURATION: 98 % | DIASTOLIC BLOOD PRESSURE: 86 MMHG | TEMPERATURE: 97.5 F | WEIGHT: 196.2 LBS | HEIGHT: 68 IN

## 2023-08-03 DIAGNOSIS — R42 VERTIGO: Primary | ICD-10-CM

## 2023-08-03 PROCEDURE — 99214 OFFICE O/P EST MOD 30 MIN: CPT | Performed by: FAMILY MEDICINE

## 2023-08-03 RX ORDER — ONDANSETRON 4 MG/1
4 TABLET, FILM COATED ORAL EVERY 8 HOURS PRN
Qty: 20 TABLET | Refills: 0 | Status: SHIPPED | OUTPATIENT
Start: 2023-08-03

## 2023-08-03 NOTE — PROGRESS NOTES
Name: Svetlana Mcgrath      : 1975      MRN: [id number]  Encounter Provider: Lito Godienz MD  Encounter Date: 8/3/2023   Encounter department: [place] [address]. Vertigo  Comments:  Has has similar sx in the past and did VT. Likely peripheral. Neuro exam unrevealing. Neg HINTS test. Deferred [place] today and educated on how to do eply. Zofran ordered for nausea. If it doesn't improve with eply, will refer back to VT   Orders:  -     ondansetron (ZOFRAN) 4 mg tablet; Take 1 tablet (4 mg total) by mouth every 8 (eight) hours as needed for nausea or vomiting           Subjective      Vertigo x 2 days    A/w nausea   Came on suddenly  Worse when she turns her head to the right  Has similar symptoms in the past and did VT  No head injuries or blurry vision  Head feels foggy   Off blanced in the morning  Zofran has helped with nausea   Was dehydrated but is now drinking more water    Dizziness  This is a recurrent problem. The current episode started in the past 7 days. The problem occurs intermittently. The problem has been unchanged. Associated symptoms include nausea. Pertinent negatives include no abdominal pain, headaches, vomiting or weakness. Exacerbated by: head turning  She has tried drinking for the symptoms. Nausea  Associated symptoms include nausea. Pertinent negatives include no abdominal pain, headaches, vomiting or weakness. Review of Systems   Eyes: Negative for visual disturbance. Cardiovascular: Negative for palpitations. Gastrointestinal: Positive for nausea. Negative for abdominal pain, diarrhea and vomiting. Neurological: Positive for dizziness. Negative for speech difficulty, weakness and headaches.        Current Outpatient Medications on File Prior to Visit   Medication Sig   • ergocalciferol (VITAMIN D2) 50,000 units Take 1 capsule (50,000 Units total) by mouth once a week for 8 doses   • levonorgestrel (MIRENA) 20 MCG/24HR IUD 1 each by Intrauterine route once   • lisinopril (ZESTRIL) 20 mg tablet Take 1 tablet (20 mg total) by mouth daily       Objective     /86 (BP Location: Left arm, Patient Position: Sitting, Cuff Size: Large)   Pulse 70   Temp 97.5 °F (36.4 °C)   Resp 18   Ht 5' 8" (1.727 m)   Wt 89 kg (196 lb 3.2 oz)   SpO2 98%   BMI 29.83 kg/m²     Physical Exam  Vitals reviewed. Constitutional:       General: She is not in acute distress. Appearance: Normal appearance. She is not ill-appearing or toxic-appearing. HENT:      Head: Normocephalic and atraumatic. Eyes:      Extraocular Movements: Extraocular movements intact. Neurological:      Mental Status: She is alert and oriented to person, place, and time. Cranial Nerves: No cranial nerve deficit. Motor: No weakness or pronator drift. Coordination: Romberg sign negative. Finger-Nose-Finger Test normal. Rapid alternating movements normal.      Gait: Gait normal.   Psychiatric:         Mood and Affect: Mood normal.         Behavior: Behavior normal.         Thought Content:  Thought content normal.       Talia Rivera MD

## 2023-11-14 ENCOUNTER — CLINICAL SUPPORT (OUTPATIENT)
Dept: FAMILY MEDICINE CLINIC | Facility: CLINIC | Age: 48
End: 2023-11-14
Payer: COMMERCIAL

## 2023-11-14 DIAGNOSIS — Z11.1 TUBERCULOSIS SCREENING: Primary | ICD-10-CM

## 2023-11-14 PROCEDURE — 86580 TB INTRADERMAL TEST: CPT

## 2023-11-14 NOTE — PROGRESS NOTES
Name: Korey Turner      : 1975      MRN: [id number]  Encounter Provider: Tamia Nick LPN  Encounter Date: 2023   Encounter department: [facility]     1.  Tuberculosis screening  -     TB Skin Test           Subjective          Current Outpatient Medications on File Prior to Visit   Medication Sig    ergocalciferol (VITAMIN D2) 50,000 units Take 1 capsule (50,000 Units total) by mouth once a week for 8 doses    levonorgestrel (MIRENA) 20 MCG/24HR IUD 1 each by Intrauterine route once    lisinopril (ZESTRIL) 20 mg tablet Take 1 tablet (20 mg total) by mouth daily    ondansetron (ZOFRAN) 4 mg tablet Take 1 tablet (4 mg total) by mouth every 8 (eight) hours as needed for nausea or vomiting       Objective       Tamia Nick LPN

## 2023-11-16 ENCOUNTER — OFFICE VISIT (OUTPATIENT)
Dept: FAMILY MEDICINE CLINIC | Facility: CLINIC | Age: 48
End: 2023-11-16
Payer: COMMERCIAL

## 2023-11-16 VITALS
SYSTOLIC BLOOD PRESSURE: 127 MMHG | HEART RATE: 83 BPM | RESPIRATION RATE: 14 BRPM | DIASTOLIC BLOOD PRESSURE: 71 MMHG | HEIGHT: 69 IN | WEIGHT: 195.6 LBS | TEMPERATURE: 96.8 F | BODY MASS INDEX: 28.97 KG/M2 | OXYGEN SATURATION: 98 %

## 2023-11-16 DIAGNOSIS — R73.03 PREDIABETES: ICD-10-CM

## 2023-11-16 DIAGNOSIS — Z11.1 ENCOUNTER FOR PPD SKIN TEST READING: ICD-10-CM

## 2023-11-16 DIAGNOSIS — Z00.00 ANNUAL PHYSICAL EXAM: Primary | ICD-10-CM

## 2023-11-16 LAB
INDURATION: 0 MM
TB SKIN TEST: NEGATIVE

## 2023-11-16 PROCEDURE — 99396 PREV VISIT EST AGE 40-64: CPT | Performed by: NURSE PRACTITIONER

## 2023-11-16 NOTE — PROGRESS NOTES
[facility]    NAME: [address] [place] [address]  AGE: 57 y.o. SEX: female  : 1975     DATE: 2023     Assessment and Plan:     Problem List Items Addressed This Visit          Other    Annual physical exam - Primary     Unremarkable exam of adult female. Screenings are up to date, had her flu shot. Continue to encourage healthy diet and regular exercise. Encounter for PPD skin test reading     Negative ppd result         Prediabetes     Stable with current management. , had A1c done in may. Continue to work on eating a healthy and balanced diet, encourage regular exercise. Immunizations and preventive care screenings were discussed with patient today. Appropriate education was printed on patient's after visit summary. Counseling:  Dental Health: discussed importance of regular tooth brushing, flossing, and dental visits. Injury prevention: discussed safety/seat belts, safety helmets, smoke detectors, carbon dioxide detectors, and smoking near bedding or upholstery. Exercise: the importance of regular exercise/physical activity was discussed. Recommend exercise 3-5 times per week for at least 30 minutes. BMI Counseling: Body mass index is 29.3 kg/m². The BMI is above normal. Nutrition recommendations include decreasing portion sizes, encouraging healthy choices of fruits and vegetables, consuming healthier snacks, moderation in carbohydrate intake, increasing intake of lean protein, reducing intake of saturated and trans fat and reducing intake of cholesterol. Exercise recommendations include moderate physical activity 150 minutes/week, exercising 3-5 times per week and strength training exercises. No pharmacotherapy was ordered. Rationale for BMI follow-up plan is due to patient being overweight or obese.      Depression Screening and Follow-up Plan: Patient was screened for depression during today's encounter. They screened negative with a PHQ-2 score of 0. No follow-ups on file. Chief Complaint:     Chief Complaint   Patient presents with    Physical Exam     Pattient is here for her annual wellness and PPD read      History of Present Illness:     Adult Annual Physical   Patient here for a comprehensive physical exam. The patient reports no problems. Diet and Physical Activity  Diet/Nutrition: well balanced diet and limited junk food. Exercise:  work is physically active . Depression Screening  PHQ-2/9 Depression Screening    Little interest or pleasure in doing things: 0 - not at all  Feeling down, depressed, or hopeless: 0 - not at all  PHQ-2 Score: 0  PHQ-2 Interpretation: Negative depression screen       General Health  Sleep: sleeps well, gets 7-8 hours of sleep on average, and gets more than 8 hours of sleep on average. Hearing: normal - bilateral.  Vision: no vision problems, goes for regular eye exams, and wears reading glasses . Dental: regular dental visits, brushes teeth twice daily, and flosses teeth occasionally. /GYN Health  Follows with gynecology? yes   Patient is: premenopausal  Last menstrual period: occasional spotting due to IUD  Contraceptive method: IUD placement. Advanced Care Planning  Do you have an advanced directive? no  Do you have a durable medical power of ? no   Review of Systems:     Review of Systems   Constitutional:  Negative for activity change, appetite change, chills, fatigue, fever and unexpected weight change. HENT:  Negative for hearing loss. Eyes:  Negative for visual disturbance. Respiratory:  Negative for cough, chest tightness and shortness of breath. Cardiovascular:  Negative for chest pain, palpitations and leg swelling. Gastrointestinal:  Negative for constipation, diarrhea, nausea and vomiting. Genitourinary:  Negative for dysuria and frequency.    Musculoskeletal:  Negative for arthralgias and myalgias. Allergic/Immunologic: Negative for environmental allergies. Neurological:  Negative for dizziness, weakness, numbness and headaches. Psychiatric/Behavioral:  Negative for sleep disturbance. The patient is not nervous/anxious. Past Medical History:     Past Medical History:   Diagnosis Date    Abnormal Pap smear of cervix     Allergic rhinitis     Anemia     Dizziness     Seasonal allergies     Vertigo       Past Surgical History:     Past Surgical History:   Procedure Laterality Date    ARTHROTOMY ANKLE  2/5/2021    Procedure: ARTHROTOMY RADIAL CARPAL TUNNEL JOINT;  Surgeon: Tersee Ingram MD;  Location: [facility];  Service: Orthopedics    BREAST EXCISIONAL BIOPSY Left 1994    benign    GYNECOLOGIC CRYOSURGERY      x2    WI EXCISION GANGLION WRIST DORSAL/VOLAR PRIMARY Right 2/5/2021    Procedure: EXCISION GANGLION CYST;  Surgeon: Terese Ingram MD;  Location: [facility];  Service: Orthopedics    TUBAL LIGATION        Social History:     Social History     Socioeconomic History    Marital status: /Civil Union     Spouse name: None    Number of children: None    Years of education: None    Highest education level: None   Occupational History    None   Tobacco Use    Smoking status: Never     Passive exposure: Never    Smokeless tobacco: Never   Vaping Use    Vaping Use: Never used   Substance and Sexual Activity    Alcohol use: No    Drug use: No    Sexual activity: Yes     Partners: Male     Birth control/protection: Female Sterilization, I.U.D.    Other Topics Concern    None   Social History Narrative    None     Social Determinants of Health     Financial Resource Strain: Not on file   Food Insecurity: Not on file   Transportation Needs: Not on file   Physical Activity: Not on file   Stress: Not on file   Social Connections: Not on file   Intimate Partner Violence: Not on file   Housing Stability: Not on file      Family History:     Family History   Problem Relation Age of Onset    No Known Problems Mother     Sarcoidosis Father     Breast cancer Sister 39    Breast cancer Maternal Grandmother 79      Current Medications:     Current Outpatient Medications   Medication Sig Dispense Refill    ergocalciferol (VITAMIN D2) 50,000 units Take 1 capsule (50,000 Units total) by mouth once a week for 8 doses 8 capsule 0    levonorgestrel (MIRENA) 20 MCG/24HR IUD 1 each by Intrauterine route once      lisinopril (ZESTRIL) 20 mg tablet Take 1 tablet (20 mg total) by mouth daily 90 tablet 0    ondansetron (ZOFRAN) 4 mg tablet Take 1 tablet (4 mg total) by mouth every 8 (eight) hours as needed for nausea or vomiting 20 tablet 0     No current facility-administered medications for this visit. Allergies: Allergies   Allergen Reactions    Shellfish-Derived Products - Food Allergy Throat Swelling      Physical Exam:     /71   Pulse 83   Temp (!) 96.8 °F (36 °C)   Resp 14   Ht 5' 8.5" (1.74 m)   Wt 88.7 kg (195 lb 9.6 oz)   SpO2 98%   BMI 29.30 kg/m²     Physical Exam  Vitals reviewed. Constitutional:       General: She is awake. She is not in acute distress. Appearance: Normal appearance. She is well-developed and well-groomed. She is not ill-appearing. HENT:      Head: Normocephalic. Right Ear: Hearing, tympanic membrane, ear canal and external ear normal.      Left Ear: Hearing, tympanic membrane, ear canal and external ear normal.      Nose: Nose normal.      Mouth/Throat:      Lips: Pink. Mouth: Mucous membranes are moist.      Pharynx: Oropharynx is clear. Uvula midline. Eyes:      General: Lids are normal.      Conjunctiva/sclera: Conjunctivae normal.      Pupils: Pupils are equal, round, and reactive to light. Neck:      Thyroid: No thyroid mass or thyromegaly. Vascular: Normal carotid pulses. No carotid bruit or JVD. Cardiovascular:      Rate and Rhythm: Normal rate and regular rhythm. Pulses: Normal pulses.       Heart sounds: S1 normal and S2 normal. Murmur heard. Pulmonary:      Effort: Pulmonary effort is normal.      Breath sounds: Normal breath sounds. Abdominal:      General: Bowel sounds are normal.      Palpations: Abdomen is soft. Tenderness: There is no abdominal tenderness. Musculoskeletal:         General: Normal range of motion. Cervical back: Full passive range of motion without pain. Right lower leg: No edema. Left lower leg: No edema. Lymphadenopathy:      Head:      Right side of head: No submental, submandibular, tonsillar, preauricular, posterior auricular or occipital adenopathy. Left side of head: No submental, submandibular, tonsillar, preauricular, posterior auricular or occipital adenopathy. Cervical: No cervical adenopathy. Skin:     General: Skin is warm and dry. Neurological:      General: No focal deficit present. Mental Status: She is alert and oriented to person, place, and time. Sensory: No sensory deficit. Motor: Motor function is intact. Psychiatric:         Attention and Perception: Attention normal.         Mood and Affect: Mood normal.         Speech: Speech normal.         Behavior: Behavior normal. Behavior is cooperative. Thought Content:  Thought content normal.         Cognition and Memory: Cognition normal.         Judgment: Judgment normal.          Mary Anne Gibbons, [facility]

## 2023-11-16 NOTE — ASSESSMENT & PLAN NOTE
Stable with current management. , had A1c done in may. Continue to work on eating a healthy and balanced diet, encourage regular exercise.

## 2023-11-16 NOTE — PATIENT INSTRUCTIONS
Wellness Visit for Adults   AMBULATORY CARE:   A wellness visit  is when you see your healthcare provider to get screened for health problems. Your healthcare provider will also give you advice on how to stay healthy. Write down your questions so you remember to ask them. Ask your healthcare provider how often you should have a wellness visit. What happens at a wellness visit:  Your healthcare provider will ask about your health, and your family history of health problems. This includes high blood pressure, heart disease, and cancer. He or she will ask if you have symptoms that concern you, if you smoke, and about your mood. You may also be asked about your intake of medicines, supplements, food, and alcohol. Any of the following may be done: Your weight  will be checked. Your height may also be checked so your body mass index (BMI) can be calculated. Your BMI shows if you are at a healthy weight. Your blood pressure  and heart rate will be checked. Your temperature may also be checked. Blood and urine tests  may be done. Blood tests may be done to check your cholesterol levels. Abnormal cholesterol levels increase your risk for heart disease and stroke. You may also need a blood or urine test to check for diabetes if you are at increased risk. Urine tests may be done to look for signs of an infection or kidney disease. A physical exam  includes checking your heartbeat and lungs with a stethoscope. Your healthcare provider may also check your skin to look for sun damage. Screening tests  may be recommended. A screening test is done to check for diseases that may not cause symptoms. The screening tests you may need depend on your age, gender, family history, and lifestyle habits. For example, colorectal screening may be recommended if you are 48years old or older. Screening tests you need if you are a woman:   A Pap smear  is used to screen for cervical cancer.  Pap smears are usually done every 3 to 5 years depending on your age. You may need them more often if you have had abnormal Pap smear test results in the past. Ask your healthcare provider how often you should have a Pap smear. A mammogram  is an x-ray of your breasts to screen for breast cancer. Experts recommend mammograms every 2 years starting at age 48 years. You may need a mammogram at age 52 years or younger if you have an increased risk for breast cancer. Talk to your healthcare provider about when you should start having mammograms and how often you need them. Vaccines you may need:   Get an influenza vaccine  every year. The influenza vaccine protects you from the flu. Several types of viruses cause the flu. The viruses change over time, so new vaccines are made each year. Get a tetanus-diphtheria (Td) booster vaccine  every 10 years. This vaccine protects you against tetanus and diphtheria. Tetanus is a severe infection that may cause painful muscle spasms and lockjaw. Diphtheria is a severe bacterial infection that causes a thick covering in the back of your mouth and throat. Get a human papillomavirus (HPV) vaccine  if you are female and aged 23 to 32 or male 23 to 24 and never received it. This vaccine protects you from HPV infection. HPV is the most common infection spread by sexual contact. HPV may also cause vaginal, penile, and anal cancers. Get a pneumococcal vaccine  if you are aged 72 years or older. The pneumococcal vaccine is an injection given to protect you from pneumococcal disease. Pneumococcal disease is an infection caused by pneumococcal bacteria. The infection may cause pneumonia, meningitis, or an ear infection. Get a shingles vaccine  if you are 60 or older, even if you have had shingles before. The shingles vaccine is an injection to protect you from the varicella-zoster virus. This is the same virus that causes chickenpox.  Shingles is a painful rash that develops in people who had chickenpox or have been exposed to the virus. How to eat healthy:  My Plate is a model for planning healthy meals. It shows the types and amounts of foods that should go on your plate. Fruits and vegetables make up about half of your plate, and grains and protein make up the other half. A serving of dairy is included on the side of your plate. The amount of calories and serving sizes you need depends on your age, gender, weight, and height. Examples of healthy foods are listed below:  Eat a variety of vegetables  such as dark green, red, and orange vegetables. You can also include canned vegetables low in sodium (salt) and frozen vegetables without added butter or sauces. Eat a variety of fresh fruits , canned fruit in 100% juice, frozen fruit, and dried fruit. Include whole grains. At least half of the grains you eat should be whole grains. Examples include whole-wheat bread, wheat pasta, brown rice, and whole-grain cereals such as oatmeal.    Eat a variety of protein foods such as seafood (fish and shellfish), lean meat, and poultry without skin (turkey and chicken). Examples of lean meats include pork leg, shoulder, or tenderloin, and beef round, sirloin, tenderloin, and extra lean ground beef. Other protein foods include eggs and egg substitutes, beans, peas, soy products, nuts, and seeds. Choose low-fat dairy products such as skim or 1% milk or low-fat yogurt, cheese, and cottage cheese. Limit unhealthy fats  such as butter, hard margarine, and shortening. Exercise:  Exercise at least 30 minutes per day on most days of the week. Some examples of exercise include walking, biking, dancing, and swimming. You can also fit in more physical activity by taking the stairs instead of the elevator or parking farther away from stores. Include muscle strengthening activities 2 days each week. Regular exercise provides many health benefits.  It helps you manage your weight, and decreases your risk for type 2 diabetes, heart disease, stroke, and high blood pressure. Exercise can also help improve your mood. Ask your healthcare provider about the best exercise plan for you. General health and safety guidelines:   Do not smoke. Nicotine and other chemicals in cigarettes and cigars can cause lung damage. Ask your healthcare provider for information if you currently smoke and need help to quit. E-cigarettes or smokeless tobacco still contain nicotine. Talk to your healthcare provider before you use these products. Limit alcohol. A drink of alcohol is 12 ounces of beer, 5 ounces of wine, or 1½ ounces of liquor. Lose weight, if needed. Being overweight increases your risk of certain health conditions. These include heart disease, high blood pressure, type 2 diabetes, and certain types of cancer. Protect your skin. Do not sunbathe or use tanning beds. Use sunscreen with a SPF 15 or higher. Apply sunscreen at least 15 minutes before you go outside. Reapply sunscreen every 2 hours. Wear protective clothing, hats, and sunglasses when you are outside. Drive safely. Always wear your seatbelt. Make sure everyone in your car wears a seatbelt. A seatbelt can save your life if you are in an accident. Do not use your cell phone when you are driving. This could distract you and cause an accident. Pull over if you need to make a call or send a text message. Practice safe sex. Use latex condoms if are sexually active and have more than one partner. Your healthcare provider may recommend screening tests for sexually transmitted infections (STIs). Wear helmets, lifejackets, and protective gear. Always wear a helmet when you ride a bike or motorcycle, go skiing, or play sports that could cause a head injury. Wear protective equipment when you play sports. Wear a lifejacket when you are on a boat or doing water sports.     © Copyright Sourav Villar 2023 Information is for End User's use only and may not be sold, redistributed or otherwise used for commercial purposes. The above information is an  only. It is not intended as medical advice for individual conditions or treatments. Talk to your doctor, nurse or pharmacist before following any medical regimen to see if it is safe and effective for you.

## 2023-11-16 NOTE — ASSESSMENT & PLAN NOTE
Unremarkable exam of adult female. Screenings are up to date, had her flu shot. Continue to encourage healthy diet and regular exercise.

## 2023-11-19 DIAGNOSIS — I10 BENIGN ESSENTIAL HTN: ICD-10-CM

## 2023-11-20 RX ORDER — LISINOPRIL 20 MG/1
20 TABLET ORAL DAILY
Qty: 90 TABLET | Refills: 1 | Status: SHIPPED | OUTPATIENT
Start: 2023-11-20

## 2023-11-27 ENCOUNTER — HOSPITAL ENCOUNTER (OUTPATIENT)
Dept: RADIOLOGY | Facility: HOSPITAL | Age: 48
Discharge: HOME | End: 2023-11-27
Attending: FAMILY MEDICINE
Payer: COMMERCIAL

## 2023-11-27 ENCOUNTER — CLINICAL SUPPORT (OUTPATIENT)
Dept: FAMILY MEDICINE CLINIC | Facility: CLINIC | Age: 48
End: 2023-11-27
Payer: COMMERCIAL

## 2023-11-27 DIAGNOSIS — Z11.1 SCREENING-PULMONARY TB: Primary | ICD-10-CM

## 2023-11-27 DIAGNOSIS — M79.671 RIGHT FOOT PAIN: ICD-10-CM

## 2023-11-27 PROCEDURE — 86580 TB INTRADERMAL TEST: CPT

## 2023-11-27 PROCEDURE — 73630 X-RAY EXAM OF FOOT: CPT

## 2023-11-27 NOTE — PROGRESS NOTES
Name: Manuela Manzo      : 1975      MRN: [id number]  Encounter Provider: Melania White  Encounter Date: 2023   Encounter department: [facility]     1. Screening-pulmonary TB  -     TB Skin Test           Subjective      HPI  Review of Systems    Current Outpatient Medications on File Prior to Visit   Medication Sig    ergocalciferol (VITAMIN D2) 50,000 units Take 1 capsule (50,000 Units total) by mouth once a week for 8 doses    levonorgestrel (MIRENA) 20 MCG/24HR IUD 1 each by Intrauterine route once    lisinopril (ZESTRIL) 20 mg tablet Take 1 tablet (20 mg total) by mouth daily    ondansetron (ZOFRAN) 4 mg tablet Take 1 tablet (4 mg total) by mouth every 8 (eight) hours as needed for nausea or vomiting       Objective     There were no vitals taken for this visit.       Melania White

## 2023-11-29 ENCOUNTER — CLINICAL SUPPORT (OUTPATIENT)
Dept: FAMILY MEDICINE CLINIC | Facility: CLINIC | Age: 48
End: 2023-11-29

## 2023-11-29 DIAGNOSIS — Z11.1 ENCOUNTER FOR PPD SKIN TEST READING: Primary | ICD-10-CM

## 2023-11-29 LAB
INDURATION: 0 MM
TB SKIN TEST: NEGATIVE

## 2023-11-29 NOTE — PROGRESS NOTES
Name: Imani Doyle      : 1975      MRN: [id number]  Encounter Provider: Mary Donnelly LPN  Encounter Date: 2023   Encounter department: [facility]     1.  Encounter for PPD skin test reading           Subjective        Current Outpatient Medications on File Prior to Visit   Medication Sig    ergocalciferol (VITAMIN D2) 50,000 units Take 1 capsule (50,000 Units total) by mouth once a week for 8 doses    levonorgestrel (MIRENA) 20 MCG/24HR IUD 1 each by Intrauterine route once    lisinopril (ZESTRIL) 20 mg tablet Take 1 tablet (20 mg total) by mouth daily    ondansetron (ZOFRAN) 4 mg tablet Take 1 tablet (4 mg total) by mouth every 8 (eight) hours as needed for nausea or vomiting       Objective       Mary Donnelly LPN

## 2023-12-19 ENCOUNTER — APPOINTMENT (OUTPATIENT)
Dept: URGENT CARE | Age: 48
End: 2023-12-19
Payer: OTHER MISCELLANEOUS

## 2023-12-19 ENCOUNTER — TELEPHONE (OUTPATIENT)
Dept: FAMILY MEDICINE CLINIC | Facility: CLINIC | Age: 48
End: 2023-12-19

## 2023-12-19 PROCEDURE — 99283 EMERGENCY DEPT VISIT LOW MDM: CPT

## 2023-12-19 PROCEDURE — G0382 LEV 3 HOSP TYPE B ED VISIT: HCPCS

## 2023-12-19 NOTE — TELEPHONE ENCOUNTER
LM for patient since patient hurt her self at work, she will need to see a workmans comp doctor as our office and provider do not do workmans comp and they are not certifed to do so. Patient can go to [facility]

## 2023-12-23 ENCOUNTER — APPOINTMENT (OUTPATIENT)
Dept: URGENT CARE | Age: 48
End: 2023-12-23
Payer: OTHER MISCELLANEOUS

## 2023-12-23 PROCEDURE — 99213 OFFICE O/P EST LOW 20 MIN: CPT

## 2023-12-27 ENCOUNTER — APPOINTMENT (OUTPATIENT)
Dept: URGENT CARE | Age: 48
End: 2023-12-27
Payer: OTHER MISCELLANEOUS

## 2023-12-27 PROCEDURE — 99213 OFFICE O/P EST LOW 20 MIN: CPT

## 2023-12-28 ENCOUNTER — APPOINTMENT (OUTPATIENT)
Dept: URGENT CARE | Age: 48
End: 2023-12-28

## 2024-02-21 PROBLEM — Z12.39 SCREENING FOR MALIGNANT NEOPLASM OF BREAST: Status: RESOLVED | Noted: 2019-01-28 | Resolved: 2024-02-21

## 2024-02-21 PROBLEM — Z01.419 ENCOUNTER FOR GYNECOLOGICAL EXAMINATION (GENERAL) (ROUTINE) WITHOUT ABNORMAL FINDINGS: Status: RESOLVED | Noted: 2019-01-28 | Resolved: 2024-02-21

## 2024-03-28 DIAGNOSIS — I10 BENIGN ESSENTIAL HTN: ICD-10-CM

## 2024-03-28 RX ORDER — LISINOPRIL 20 MG/1
20 TABLET ORAL DAILY
Qty: 90 TABLET | Refills: 1 | Status: SHIPPED | OUTPATIENT
Start: 2024-03-28

## 2024-04-10 ENCOUNTER — ANNUAL EXAM (OUTPATIENT)
Age: 49
End: 2024-04-10
Payer: COMMERCIAL

## 2024-04-10 VITALS
HEIGHT: 68 IN | WEIGHT: 193.6 LBS | SYSTOLIC BLOOD PRESSURE: 118 MMHG | BODY MASS INDEX: 29.34 KG/M2 | DIASTOLIC BLOOD PRESSURE: 72 MMHG

## 2024-04-10 DIAGNOSIS — Z12.31 ENCOUNTER FOR SCREENING MAMMOGRAM FOR MALIGNANT NEOPLASM OF BREAST: ICD-10-CM

## 2024-04-10 DIAGNOSIS — Z30.432 ENCOUNTER FOR IUD REMOVAL: ICD-10-CM

## 2024-04-10 DIAGNOSIS — Z30.011 ENCOUNTER FOR INITIAL PRESCRIPTION OF CONTRACEPTIVE PILLS: ICD-10-CM

## 2024-04-10 DIAGNOSIS — Z01.419 ENCOUNTER FOR ANNUAL ROUTINE GYNECOLOGICAL EXAMINATION: Primary | ICD-10-CM

## 2024-04-10 PROBLEM — Z97.5 IUD (INTRAUTERINE DEVICE) IN PLACE: Status: RESOLVED | Noted: 2020-03-16 | Resolved: 2024-04-10

## 2024-04-10 PROCEDURE — 58301 REMOVE INTRAUTERINE DEVICE: CPT | Performed by: OBSTETRICS & GYNECOLOGY

## 2024-04-10 PROCEDURE — S0612 ANNUAL GYNECOLOGICAL EXAMINA: HCPCS | Performed by: OBSTETRICS & GYNECOLOGY

## 2024-04-10 RX ORDER — LEVONORGESTREL AND ETHINYL ESTRADIOL 0.1-0.02MG
1 KIT ORAL DAILY
Qty: 84 TABLET | Refills: 3 | Status: SHIPPED | OUTPATIENT
Start: 2024-04-10

## 2024-04-10 NOTE — PROGRESS NOTES
Assessment/Plan:    1. Encounter for screening mammogram for malignant neoplasm of breast    - Mammo screening bilateral w 3d & cad; Future    2. Encounter for annual routine gynecological examination      3. Encounter for initial prescription of contraceptive pills    - levonorgestrel-ethinyl estradiol (Aviane) 0.1-20 MG-MCG per tablet; Take 1 tablet by mouth daily  Dispense: 84 tablet; Refill: 3    4. Encounter for IUD removal        Subjective      Joanne Damon is a 48 y.o. female who presents for annual exam. Bleeding is still unpredictable and is affected sexual function.  She denies any breast or urinary concerns.    Current contraception: IUD  History of abnormal Pap smear: no  Regular self breast exam: yes  History of abnormal mammogram: no  History of abnormal lipids: no    Menstrual History:  OB History          4    Para   3    Term   3       0    AB   1    Living   3         SAB   0    IAB   0    Ectopic   0    Multiple   0    Live Births   3                  No LMP recorded. Patient has had an implant.  Period Duration (Days):  (light bleeding on IUD)  Period Pattern: (!) Irregular  Menstrual Flow: Moderate, Light  Menstrual Control: Panty liner  Dysmenorrhea: None    Past Medical History:   Diagnosis Date   • Abnormal Pap smear of cervix    • Allergic rhinitis    • Anemia    • Dizziness    • Miscarriage 2012    1 miscarriage when I had my tubes tied   • Seasonal allergies    • Vertigo        Family History   Problem Relation Age of Onset   • No Known Problems Mother    • Sarcoidosis Father    • Breast cancer Sister 45        3 years ago   • Breast cancer Maternal Grandmother 70        When she was 65       The following portions of the patient's history were reviewed and updated as appropriate: allergies, current medications, past family history, past medical history, past social history, past surgical history, and problem list.    Review of Systems  Pertinent items are noted in HPI.  "     Objective      /72 (BP Location: Right arm, Patient Position: Sitting, Cuff Size: Large)   Ht 5' 7.75\" (1.721 m)   Wt 87.8 kg (193 lb 9.6 oz)   BMI 29.65 kg/m²     General:   alert and oriented, in no acute distress and cooperative   Heart:  Breasts: regular rate and rhythm  appear normal, no suspicious masses, no skin or nipple changes or axillary nodes.   Lungs: Effort normal   Abdomen: soft, non-tender, without masses or organomegaly   Vulva: normal   Vagina: normal mucosa   Cervix: no lesions and IUD strings seen   Uterus: normal size, mobile, non-tender   Adnexa: normal adnexa and no mass, fullness, tenderness           Iud removal    Date/Time: 4/10/2024 2:30 PM    Performed by: Gabriela Sharma MD  Authorized by: Gabriela Sharma MD  Universal Protocol:  Consent: Verbal consent obtained.  Risks and benefits: risks, benefits and alternatives were discussed  Consent given by: patient  Time out: Immediately prior to procedure a \"time out\" was called to verify the correct patient, procedure, equipment, support staff and site/side marked as required.  Patient understanding: patient states understanding of the procedure being performed  Required items: required blood products, implants, devices, and special equipment available  Patient identity confirmed: verbally with patient    Procedure:     Removed with no complications: yes        "

## 2024-05-06 ENCOUNTER — TELEPHONE (OUTPATIENT)
Age: 49
End: 2024-05-06

## 2024-05-06 DIAGNOSIS — I10 BENIGN ESSENTIAL HTN: ICD-10-CM

## 2024-05-06 DIAGNOSIS — Z00.00 ENCOUNTER FOR SCREENING AND PREVENTATIVE CARE: Primary | ICD-10-CM

## 2024-05-09 ENCOUNTER — PATIENT MESSAGE (OUTPATIENT)
Age: 49
End: 2024-05-09

## 2024-05-09 DIAGNOSIS — Z30.011 ENCOUNTER FOR INITIAL PRESCRIPTION OF CONTRACEPTIVE PILLS: ICD-10-CM

## 2024-05-10 RX ORDER — LEVONORGESTREL/ETHIN.ESTRADIOL 0.1-0.02MG
1 TABLET ORAL DAILY
Qty: 84 TABLET | Refills: 3 | Status: SHIPPED | OUTPATIENT
Start: 2024-05-10

## 2024-05-28 ENCOUNTER — OFFICE VISIT (OUTPATIENT)
Dept: FAMILY MEDICINE CLINIC | Facility: CLINIC | Age: 49
End: 2024-05-28
Payer: COMMERCIAL

## 2024-05-28 VITALS
BODY MASS INDEX: 28.79 KG/M2 | RESPIRATION RATE: 18 BRPM | DIASTOLIC BLOOD PRESSURE: 64 MMHG | OXYGEN SATURATION: 97 % | HEART RATE: 90 BPM | SYSTOLIC BLOOD PRESSURE: 120 MMHG | TEMPERATURE: 97 F | HEIGHT: 68 IN | WEIGHT: 190 LBS

## 2024-05-28 DIAGNOSIS — J02.9 SORE THROAT: ICD-10-CM

## 2024-05-28 DIAGNOSIS — R50.9 FEVER, UNSPECIFIED FEVER CAUSE: ICD-10-CM

## 2024-05-28 DIAGNOSIS — J02.0 STREP THROAT: Primary | ICD-10-CM

## 2024-05-28 LAB
S PYO AG THROAT QL: POSITIVE
SARS-COV-2 AG UPPER RESP QL IA: NEGATIVE
SL AMB POCT RAPID FLU A: NEGATIVE
SL AMB POCT RAPID FLU B: NORMAL
VALID CONTROL: NORMAL

## 2024-05-28 PROCEDURE — 87880 STREP A ASSAY W/OPTIC: CPT | Performed by: NURSE PRACTITIONER

## 2024-05-28 PROCEDURE — 87804 INFLUENZA ASSAY W/OPTIC: CPT | Performed by: NURSE PRACTITIONER

## 2024-05-28 PROCEDURE — 87811 SARS-COV-2 COVID19 W/OPTIC: CPT | Performed by: NURSE PRACTITIONER

## 2024-05-28 PROCEDURE — 99213 OFFICE O/P EST LOW 20 MIN: CPT | Performed by: NURSE PRACTITIONER

## 2024-05-28 RX ORDER — AMOXICILLIN 875 MG/1
875 TABLET, COATED ORAL 2 TIMES DAILY
Qty: 20 TABLET | Refills: 0 | Status: SHIPPED | OUTPATIENT
Start: 2024-05-28 | End: 2024-06-07

## 2024-05-28 RX ORDER — LEVONORGESTREL 52 MG/1
INTRAUTERINE DEVICE INTRAUTERINE
COMMUNITY

## 2024-05-28 NOTE — PROGRESS NOTES
FAMILY PRACTICE OFFICE VISIT       NAME: Joanne Damon  AGE: 48 y.o. SEX: female       : 1975        MRN: [id number]    DATE: 2024  TIME: 12:43 PM    Assessment and Plan   1. Strep throat  -     amoxicillin (AMOXIL) 875 mg tablet; Take 1 tablet (875 mg total) by mouth 2 (two) times a day for 10 days  2. Fever, unspecified fever cause  -     POCT Rapid Covid Ag  -     POCT rapid flu A and B  -     POCT rapid ANTIGEN strepA  3. Sore throat  -     POCT rapid flu A and B  -     POCT rapid ANTIGEN strepA       There are no Patient Instructions on file for this visit.        Chief Complaint     Chief Complaint   Patient presents with    Cough     Pt being seen for cough,fever, body aches since        History of Present Illness   Joanne Damon is a 48 y.o.-year-old female who is here for illness  Started on   Fever  Body aches  Cough  Sore throat  Using tylenol and motrin      Review of Systems   Review of Systems    Active Problem List     Patient Active Problem List   Diagnosis    Abnormal bleeding in menstrual cycle    Iron deficiency anemia    Mass of right wrist    Allergic rhinitis    Vertigo    Elevated blood pressure reading without diagnosis of hypertension    Hyperbilirubinemia    Thoracogenic scoliosis of thoracolumbar region    Family history of thyroid disease    Prediabetes    Seborrheic dermatitis    Numbness of left hand    Annual physical exam    Encounter for PPD skin test reading         Past Medical History:  Past Medical History:   Diagnosis Date    Abnormal Pap smear of cervix     Allergic rhinitis     Anemia     Dizziness     Miscarriage 2012    1 miscarriage when I had my tubes tied    Seasonal allergies     Vertigo        Past Surgical History:  Past Surgical History:   Procedure Laterality Date    ARTHROTOMY ANKLE  2021    Procedure: ARTHROTOMY RADIAL CARPAL TUNNEL JOINT;  Surgeon: Alix Alcazar MD;  Location: [facility];  Service: Orthopedics    BREAST  EXCISIONAL BIOPSY Left 1994    benign    GYNECOLOGIC CRYOSURGERY      x2    RI EXCISION GANGLION WRIST DORSAL/VOLAR PRIMARY Right 2/5/2021    Procedure: EXCISION GANGLION CYST;  Surgeon: Alix Alcazar MD;  Location: [facility];  Service: Orthopedics    TUBAL LIGATION         Family History:  Family History   Problem Relation Age of Onset    No Known Problems Mother     Sarcoidosis Father     Breast cancer Sister 45        3 years ago    Breast cancer Maternal Grandmother 70        When she was 65       Social History:  Social History     Socioeconomic History    Marital status: /Civil Union     Spouse name: Not on file    Number of children: Not on file    Years of education: Not on file    Highest education level: Not on file   Occupational History    Not on file   Tobacco Use    Smoking status: Never     Passive exposure: Never    Smokeless tobacco: Never   Vaping Use    Vaping status: Never Used   Substance and Sexual Activity    Alcohol use: No    Drug use: No    Sexual activity: Yes     Partners: Male     Birth control/protection: Implant, Female Sterilization   Other Topics Concern    Not on file   Social History Narrative    Not on file     Social Determinants of Health     Financial Resource Strain: Not on file   Food Insecurity: Not on file   Transportation Needs: Not on file   Physical Activity: Not on file   Stress: Not on file   Social Connections: Not on file   Intimate Partner Violence: Not on file   Housing Stability: Not on file       Objective     Vitals:    05/28/24 1216   BP: 120/64   Pulse: 90   Resp: 18   Temp: (!) 97 °F (36.1 °C)   SpO2: 97%     Wt Readings from Last 3 Encounters:   05/28/24 86.2 kg (190 lb)   04/10/24 87.8 kg (193 lb 9.6 oz)   11/16/23 88.7 kg (195 lb 9.6 oz)       Physical Exam    Pertinent Laboratory/Diagnostic Studies:  Lab Results   Component Value Date    BUN 13 05/03/2023    CREATININE 0.96 05/03/2023    CALCIUM 9.6 05/03/2023    K 3.9 05/03/2023    CO2 29  "05/03/2023     05/03/2023     Lab Results   Component Value Date    ALT 28 05/03/2023    AST 18 05/03/2023    ALKPHOS 59 05/03/2023       Lab Results   Component Value Date    WBC 7.38 04/27/2022    HGB 13.9 04/27/2022    HCT 44.1 04/27/2022    MCV 96 04/27/2022     04/27/2022       No results found for: \"TSH\"    No results found for: \"CHOL\"  Lab Results   Component Value Date    TRIG 130 05/03/2023     Lab Results   Component Value Date    HDL 54 05/03/2023     Lab Results   Component Value Date    LDLCALC 99 05/03/2023     Lab Results   Component Value Date    HGBA1C 5.7 (H) 05/03/2023       Results for orders placed or performed in visit on 05/28/24   POCT Rapid Covid Ag   Result Value Ref Range    POCT SARS-CoV-2 Ag Negative Negative    VALID CONTROL Valid    POCT rapid flu A and B   Result Value Ref Range    RAPID FLU A negative     RAPID FLU B negatie    POCT rapid ANTIGEN strepA   Result Value Ref Range     RAPID STREP A Positive (A) Negative       Orders Placed This Encounter   Procedures    POCT Rapid Covid Ag    POCT rapid flu A and B    POCT rapid ANTIGEN strepA       ALLERGIES:  Allergies   Allergen Reactions    Shellfish-Derived Products - Food Allergy Throat Swelling       Current Medications     Current Outpatient Medications   Medication Sig Dispense Refill    amoxicillin (AMOXIL) 875 mg tablet Take 1 tablet (875 mg total) by mouth 2 (two) times a day for 10 days 20 tablet 0    levonorgestrel-ethinyl estradiol (Aviane) 0.1-20 MG-MCG per tablet Take 1 tablet by mouth daily 84 tablet 3    lisinopril (ZESTRIL) 20 mg tablet Take 1 tablet (20 mg total) by mouth daily 90 tablet 1    ergocalciferol (VITAMIN D2) 50,000 units Take 1 capsule (50,000 Units total) by mouth once a week for 8 doses 8 capsule 0    Levonorgestrel (Mirena, 52 MG,) 20 MCG/DAY IUD Take by intrauterine route.      ondansetron (ZOFRAN) 4 mg tablet Take 1 tablet (4 mg total) by mouth every 8 (eight) hours as needed for nausea " or vomiting (Patient not taking: Reported on 5/28/2024) 20 tablet 0     No current facility-administered medications for this visit.         Health Maintenance     Health Maintenance   Topic Date Due    HIV Screening  Never done    Breast Cancer Screening: Mammogram  05/23/2024    COVID-19 Vaccine (3 - 2023-24 season) 08/28/2024 (Originally 9/1/2023)    Influenza Vaccine (Season Ended) 09/01/2024    Annual Physical  04/10/2025    Depression Screening  05/28/2025    Colorectal Cancer Screening  12/18/2025    DTaP,Tdap,and Td Vaccines (2 - Td or Tdap) 01/01/2026    Cervical Cancer Screening  02/02/2027    Zoster Vaccine (1 of 2) 07/06/2027    RSV Vaccine Age 60+ Years (1 - 1-dose 60+ series) 07/06/2037    Hepatitis C Screening  Completed    RSV Vaccine age 0-20 Months  Aged Out    Pneumococcal Vaccine: Pediatrics (0 to 5 Years) and At-Risk Patients (6 to 64 Years)  Aged Out    HIB Vaccine  Aged Out    IPV Vaccine  Aged Out    Hepatitis A Vaccine  Aged Out    Meningococcal ACWY Vaccine  Aged Out    HPV Vaccine  Aged Out     Immunization History   Administered Date(s) Administered    COVID-19 MODERNA VACC 0.5 ML IM 08/14/2021, 09/11/2021    INFLUENZA 11/19/2018, 08/01/2020, 10/13/2021    Influenza Injectable, MDCK, Preservative Free, Quadrivalent, 0.5 mL 11/19/2018    Influenza, injectable, quadrivalent, preservative free 0.5 mL 11/08/2019, 09/10/2020    Tdap 01/01/2016    Tuberculin Skin Test-PPD Intradermal 11/14/2023, 11/27/2023        Recent Results (from the past 168 hour(s))   POCT Rapid Covid Ag    Collection Time: 05/28/24 12:38 PM   Result Value Ref Range    POCT SARS-CoV-2 Ag Negative Negative    VALID CONTROL Valid    POCT rapid ANTIGEN strepA    Collection Time: 05/28/24 12:38 PM   Result Value Ref Range     RAPID STREP A Positive (A) Negative   POCT rapid flu A and B    Collection Time: 05/28/24 12:39 PM   Result Value Ref Range    RAPID FLU A negative     RAPID FLU B negatie          Josseline Zapata  CRNP

## 2024-06-17 DIAGNOSIS — Z00.6 ENCOUNTER FOR EXAMINATION FOR NORMAL COMPARISON OR CONTROL IN CLINICAL RESEARCH PROGRAM: ICD-10-CM

## 2024-06-25 ENCOUNTER — HOSPITAL ENCOUNTER (OUTPATIENT)
Dept: RADIOLOGY | Age: 49
Discharge: HOME | End: 2024-06-25
Payer: COMMERCIAL

## 2024-06-25 VITALS — WEIGHT: 190 LBS | BODY MASS INDEX: 28.79 KG/M2 | HEIGHT: 68 IN

## 2024-06-25 DIAGNOSIS — Z12.31 ENCOUNTER FOR SCREENING MAMMOGRAM FOR MALIGNANT NEOPLASM OF BREAST: ICD-10-CM

## 2024-06-25 PROCEDURE — 77067 SCR MAMMO BI INCL CAD: CPT

## 2024-06-25 PROCEDURE — 77063 BREAST TOMOSYNTHESIS BI: CPT

## 2024-07-01 ENCOUNTER — APPOINTMENT (OUTPATIENT)
Dept: LAB | Facility: CLINIC | Age: 49
End: 2024-07-01
Payer: COMMERCIAL

## 2024-07-01 DIAGNOSIS — Z00.00 ENCOUNTER FOR SCREENING AND PREVENTATIVE CARE: ICD-10-CM

## 2024-07-01 DIAGNOSIS — I10 BENIGN ESSENTIAL HTN: ICD-10-CM

## 2024-07-01 DIAGNOSIS — Z00.6 ENCOUNTER FOR EXAMINATION FOR NORMAL COMPARISON OR CONTROL IN CLINICAL RESEARCH PROGRAM: ICD-10-CM

## 2024-07-01 LAB
ALBUMIN SERPL BCG-MCNC: 4.2 G/DL (ref 3.5–5)
ALP SERPL-CCNC: 40 U/L (ref 34–104)
ALT SERPL W P-5'-P-CCNC: 18 U/L (ref 7–52)
ANION GAP SERPL CALCULATED.3IONS-SCNC: 9 MMOL/L (ref 4–13)
AST SERPL W P-5'-P-CCNC: 16 U/L (ref 13–39)
BILIRUB SERPL-MCNC: 0.44 MG/DL (ref 0.2–1)
BUN SERPL-MCNC: 10 MG/DL (ref 5–25)
CALCIUM SERPL-MCNC: 9.9 MG/DL (ref 8.4–10.2)
CHLORIDE SERPL-SCNC: 105 MMOL/L (ref 96–108)
CHOLEST SERPL-MCNC: 183 MG/DL
CO2 SERPL-SCNC: 25 MMOL/L (ref 21–32)
CREAT SERPL-MCNC: 0.87 MG/DL (ref 0.6–1.3)
CREAT UR-MCNC: 111.4 MG/DL
EST. AVERAGE GLUCOSE BLD GHB EST-MCNC: 131 MG/DL
GFR SERPL CREATININE-BSD FRML MDRD: 80 ML/MIN/1.73SQ M
GLUCOSE P FAST SERPL-MCNC: 108 MG/DL (ref 65–99)
HBA1C MFR BLD: 6.2 %
HDLC SERPL-MCNC: 56 MG/DL
LDLC SERPL CALC-MCNC: 100 MG/DL (ref 0–100)
MICROALBUMIN UR-MCNC: 9.2 MG/L
MICROALBUMIN/CREAT 24H UR: 8 MG/G CREATININE (ref 0–30)
NONHDLC SERPL-MCNC: 127 MG/DL
POTASSIUM SERPL-SCNC: 4.6 MMOL/L (ref 3.5–5.3)
PROT SERPL-MCNC: 8 G/DL (ref 6.4–8.4)
SODIUM SERPL-SCNC: 139 MMOL/L (ref 135–147)
TRIGL SERPL-MCNC: 133 MG/DL

## 2024-07-01 PROCEDURE — 82043 UR ALBUMIN QUANTITATIVE: CPT

## 2024-07-01 PROCEDURE — 82570 ASSAY OF URINE CREATININE: CPT

## 2024-07-01 PROCEDURE — 83036 HEMOGLOBIN GLYCOSYLATED A1C: CPT

## 2024-07-01 PROCEDURE — 80053 COMPREHEN METABOLIC PANEL: CPT

## 2024-07-01 PROCEDURE — 36415 COLL VENOUS BLD VENIPUNCTURE: CPT

## 2024-07-01 PROCEDURE — 80061 LIPID PANEL: CPT

## 2024-07-03 DIAGNOSIS — I10 BENIGN ESSENTIAL HTN: ICD-10-CM

## 2024-07-03 RX ORDER — LISINOPRIL 20 MG/1
20 TABLET ORAL DAILY
Qty: 100 TABLET | Refills: 1 | Status: SHIPPED | OUTPATIENT
Start: 2024-07-03

## 2024-07-10 ENCOUNTER — TELEPHONE (OUTPATIENT)
Dept: FAMILY MEDICINE CLINIC | Facility: CLINIC | Age: 49
End: 2024-07-10

## 2024-07-12 LAB
APOB+LDLR+PCSK9 GENE MUT ANL BLD/T: NOT DETECTED
BRCA1+BRCA2 DEL+DUP + FULL MUT ANL BLD/T: NOT DETECTED
MLH1+MSH2+MSH6+PMS2 GN DEL+DUP+FUL M: NOT DETECTED

## 2024-07-30 ENCOUNTER — HOSPITAL ENCOUNTER (OUTPATIENT)
Dept: ULTRASOUND IMAGING | Facility: CLINIC | Age: 49
Discharge: HOME | End: 2024-07-30
Payer: COMMERCIAL

## 2024-07-30 ENCOUNTER — HOSPITAL ENCOUNTER (OUTPATIENT)
Dept: MAMMOGRAPHY | Facility: CLINIC | Age: 49
Discharge: HOME | End: 2024-07-30
Payer: COMMERCIAL

## 2024-07-30 DIAGNOSIS — R92.8 ABNORMAL MAMMOGRAM: ICD-10-CM

## 2024-07-30 PROCEDURE — G0279 TOMOSYNTHESIS, MAMMO: HCPCS

## 2024-07-30 PROCEDURE — 76642 ULTRASOUND BREAST LIMITED: CPT

## 2024-07-30 PROCEDURE — 77065 DX MAMMO INCL CAD UNI: CPT

## 2024-08-18 DIAGNOSIS — Z30.011 ENCOUNTER FOR INITIAL PRESCRIPTION OF CONTRACEPTIVE PILLS: ICD-10-CM

## 2024-08-19 RX ORDER — LEVONORGESTREL/ETHIN.ESTRADIOL 0.1-0.02MG
1 TABLET ORAL DAILY
Qty: 84 TABLET | Refills: 1 | Status: SHIPPED | OUTPATIENT
Start: 2024-08-19

## 2024-09-03 ENCOUNTER — TELEPHONE (OUTPATIENT)
Age: 49
End: 2024-09-03

## 2024-09-03 NOTE — TELEPHONE ENCOUNTER
Patient called in requesting an earlier appt. Offered today with Josseline and patient declined stating she would need something for tomorrow. Patient is experiencing sharp pain to the back of her left knee that started a week and a half ago. Please advise. Thank you.

## 2024-09-04 ENCOUNTER — OFFICE VISIT (OUTPATIENT)
Dept: FAMILY MEDICINE CLINIC | Facility: CLINIC | Age: 49
End: 2024-09-04
Payer: COMMERCIAL

## 2024-09-04 VITALS
OXYGEN SATURATION: 98 % | RESPIRATION RATE: 16 BRPM | TEMPERATURE: 97 F | WEIGHT: 193 LBS | BODY MASS INDEX: 29.25 KG/M2 | DIASTOLIC BLOOD PRESSURE: 84 MMHG | HEART RATE: 88 BPM | SYSTOLIC BLOOD PRESSURE: 124 MMHG | HEIGHT: 68 IN

## 2024-09-04 DIAGNOSIS — M25.562 POSTERIOR KNEE PAIN, LEFT: Primary | ICD-10-CM

## 2024-09-04 PROCEDURE — 99213 OFFICE O/P EST LOW 20 MIN: CPT | Performed by: FAMILY MEDICINE

## 2024-09-04 NOTE — PROGRESS NOTES
Ambulatory Visit  Name: Joanne Damon      : 1975      MRN: [id number]  Encounter Provider: Macarena Tristan MD  Encounter Date: 2024   Encounter department: [facility]    Assessment & Plan   1. Posterior knee pain, left  Comments:  Pain and swelling in the left posterior knee. Swelling appreciated and popliteal fossa. Suspect baker's cyst. Will get venous duplex to r/o DVT.  Ice, compression ( following imaging), and nsaids.   Orders:  -     VAS VENOUS DUPLEX -LOWER LIMB UNILATERAL; Future; Expected date: 2024       History of Present Illness     Presents with left posterior knee pain x 1 week  No injuries to the knee. Hurst to bend the knee or stand for a while  Pain is now in the upper part of the calf.   No redness        Review of Systems   Musculoskeletal:  Positive for arthralgias (left posterior knee and left upper calf).   Skin:  Negative for rash.     Medical History Reviewed by provider this encounter:       Past Medical History   Past Medical History:   Diagnosis Date    Abnormal Pap smear of cervix     Allergic rhinitis     Anemia     Dizziness     Miscarriage 2012    1 miscarriage when I had my tubes tied    Seasonal allergies     Vertigo      Past Surgical History:   Procedure Laterality Date    ARTHROTOMY ANKLE  2021    Procedure: ARTHROTOMY RADIAL CARPAL TUNNEL JOINT;  Surgeon: Alix Alcazar MD;  Location: [facility];  Service: Orthopedics    BREAST EXCISIONAL BIOPSY Left     benign    GYNECOLOGIC CRYOSURGERY      x2    MD EXCISION GANGLION WRIST DORSAL/VOLAR PRIMARY Right 2021    Procedure: EXCISION GANGLION CYST;  Surgeon: Alix Alcazar MD;  Location: [facility];  Service: Orthopedics    TUBAL LIGATION       Family History   Problem Relation Age of Onset    No Known Problems Mother     Sarcoidosis Father     Breast cancer Sister 45        3 years ago    No Known Problems Sister     No Known Problems Daughter     Breast cancer  Maternal Grandmother 70        When she was 65    No Known Problems Maternal Grandfather     No Known Problems Paternal Grandmother     No Known Problems Paternal Grandfather     No Known Problems Maternal Aunt     No Known Problems Maternal Aunt     No Known Problems Maternal Aunt     No Known Problems Maternal Aunt     No Known Problems Maternal Aunt     No Known Problems Paternal Aunt     No Known Problems Paternal Aunt     No Known Problems Paternal Aunt     No Known Problems Paternal Aunt     No Known Problems Son     No Known Problems Son      Current Outpatient Medications on File Prior to Visit   Medication Sig Dispense Refill    levonorgestrel-ethinyl estradiol (Aviane) 0.1-20 MG-MCG per tablet Take 1 tablet by mouth daily 84 tablet 1    lisinopril (ZESTRIL) 20 mg tablet Take 1 tablet (20 mg total) by mouth daily 100 tablet 1    ergocalciferol (VITAMIN D2) 50,000 units Take 1 capsule (50,000 Units total) by mouth once a week for 8 doses 8 capsule 0    [DISCONTINUED] Levonorgestrel (Mirena, 52 MG,) 20 MCG/DAY IUD Take by intrauterine route.      [DISCONTINUED] ondansetron (ZOFRAN) 4 mg tablet Take 1 tablet (4 mg total) by mouth every 8 (eight) hours as needed for nausea or vomiting (Patient not taking: Reported on 5/28/2024) 20 tablet 0     No current facility-administered medications on file prior to visit.     Allergies   Allergen Reactions    Shellfish-Derived Products - Food Allergy Throat Swelling      Current Outpatient Medications on File Prior to Visit   Medication Sig Dispense Refill    levonorgestrel-ethinyl estradiol (Aviane) 0.1-20 MG-MCG per tablet Take 1 tablet by mouth daily 84 tablet 1    lisinopril (ZESTRIL) 20 mg tablet Take 1 tablet (20 mg total) by mouth daily 100 tablet 1    ergocalciferol (VITAMIN D2) 50,000 units Take 1 capsule (50,000 Units total) by mouth once a week for 8 doses 8 capsule 0    [DISCONTINUED] Levonorgestrel (Mirena, 52 MG,) 20 MCG/DAY IUD Take by intrauterine route.    "   [DISCONTINUED] ondansetron (ZOFRAN) 4 mg tablet Take 1 tablet (4 mg total) by mouth every 8 (eight) hours as needed for nausea or vomiting (Patient not taking: Reported on 5/28/2024) 20 tablet 0     No current facility-administered medications on file prior to visit.      Social History     Tobacco Use    Smoking status: Never     Passive exposure: Never    Smokeless tobacco: Never   Vaping Use    Vaping status: Never Used   Substance and Sexual Activity    Alcohol use: No    Drug use: No    Sexual activity: Yes     Partners: Male     Birth control/protection: Implant, Female Sterilization     Objective     /84 (BP Location: Left arm, Patient Position: Sitting, Cuff Size: Large)   Pulse 88   Temp (!) 97 °F (36.1 °C) (Tympanic)   Resp 16   Ht 5' 7.75\" (1.721 m)   Wt 87.5 kg (193 lb)   SpO2 98%   BMI 29.56 kg/m²     Physical Exam  Constitutional:       Appearance: Normal appearance.   HENT:      Head: Normocephalic and atraumatic.   Pulmonary:      Effort: Pulmonary effort is normal.   Musculoskeletal:      Left knee: Effusion (left posterior knee) present. No swelling, deformity or bony tenderness. Decreased range of motion.      Right lower leg: No edema.      Left lower leg: Swelling (om the left popliteal fossa) present. No edema.   Skin:     Findings: No erythema.   Neurological:      Mental Status: She is alert and oriented to person, place, and time.           "

## 2024-09-05 ENCOUNTER — HOSPITAL ENCOUNTER (OUTPATIENT)
Dept: NON INVASIVE DIAGNOSTICS | Facility: CLINIC | Age: 49
Discharge: HOME | End: 2024-09-05
Payer: COMMERCIAL

## 2024-09-05 DIAGNOSIS — M25.562 POSTERIOR KNEE PAIN, LEFT: ICD-10-CM

## 2024-09-05 PROCEDURE — 93971 EXTREMITY STUDY: CPT

## 2024-09-05 PROCEDURE — 93971 EXTREMITY STUDY: CPT | Performed by: INTERNAL MEDICINE

## 2024-10-13 DIAGNOSIS — I10 BENIGN ESSENTIAL HTN: ICD-10-CM

## 2024-10-13 RX ORDER — LISINOPRIL 20 MG/1
20 TABLET ORAL DAILY
Qty: 100 TABLET | Refills: 1 | Status: SHIPPED | OUTPATIENT
Start: 2024-10-13

## 2024-10-16 ENCOUNTER — LAB (OUTPATIENT)
Dept: LAB | Age: 49
End: 2024-10-16

## 2024-10-16 ENCOUNTER — TELEPHONE (OUTPATIENT)
Age: 49
End: 2024-10-16

## 2024-10-16 ENCOUNTER — APPOINTMENT (OUTPATIENT)
Dept: URGENT CARE | Age: 49
End: 2024-10-16

## 2024-10-16 DIAGNOSIS — Z00.00 ROUTINE GENERAL MEDICAL EXAMINATION AT A HEALTH CARE FACILITY: ICD-10-CM

## 2024-10-16 DIAGNOSIS — Z01.84 IMMUNITY STATUS TESTING: Primary | ICD-10-CM

## 2024-10-16 PROCEDURE — 86480 TB TEST CELL IMMUN MEASURE: CPT

## 2024-10-16 NOTE — TELEPHONE ENCOUNTER
Patient calling to request blood work for titers  MMR, hepB, varicella and any other titer that was not mentioned. Please call patient when ordered.  Shanthi Russell

## 2024-10-17 LAB
GAMMA INTERFERON BACKGROUND BLD IA-ACNC: <0 IU/ML
M TB IFN-G BLD-IMP: NEGATIVE
M TB IFN-G CD4+ BCKGRND COR BLD-ACNC: 0 IU/ML
M TB IFN-G CD4+ BCKGRND COR BLD-ACNC: 0 IU/ML
MITOGEN IGNF BCKGRD COR BLD-ACNC: 10 IU/ML

## 2024-10-22 DIAGNOSIS — Z30.011 ENCOUNTER FOR INITIAL PRESCRIPTION OF CONTRACEPTIVE PILLS: ICD-10-CM

## 2024-10-22 RX ORDER — LEVONORGESTREL/ETHIN.ESTRADIOL 0.1-0.02MG
1 TABLET ORAL DAILY
Qty: 84 TABLET | Refills: 0 | Status: SHIPPED | OUTPATIENT
Start: 2024-10-22

## 2024-11-07 ENCOUNTER — CONSULT (OUTPATIENT)
Dept: PAIN MEDICINE | Facility: CLINIC | Age: 49
End: 2024-11-07
Payer: COMMERCIAL

## 2024-11-07 VITALS
WEIGHT: 195 LBS | SYSTOLIC BLOOD PRESSURE: 120 MMHG | DIASTOLIC BLOOD PRESSURE: 76 MMHG | HEART RATE: 86 BPM | RESPIRATION RATE: 16 BRPM | BODY MASS INDEX: 29.55 KG/M2 | HEIGHT: 68 IN

## 2024-11-07 DIAGNOSIS — G89.29 CHRONIC LEFT-SIDED LOW BACK PAIN WITHOUT SCIATICA: Primary | ICD-10-CM

## 2024-11-07 DIAGNOSIS — M79.18 MYOFASCIAL PAIN SYNDROME: ICD-10-CM

## 2024-11-07 DIAGNOSIS — M54.50 CHRONIC LEFT-SIDED LOW BACK PAIN WITHOUT SCIATICA: Primary | ICD-10-CM

## 2024-11-07 PROCEDURE — 99204 OFFICE O/P NEW MOD 45 MIN: CPT | Performed by: PHYSICAL MEDICINE & REHABILITATION

## 2024-11-07 NOTE — PROGRESS NOTES
Assessment:  1. Chronic left-sided low back pain without sciatica    2. Myofascial pain syndrome        Plan:  Ms. Damon is a pleasant 49-year-old female significant past medical history of iron deficiency anemia presents to [facility] for initial evaluation regarding isolated left mid to low back pain.  Denies any significant inciting event or recent trauma.  During today's evaluation she is demonstrating evidence of myofascial pain syndrome and questionable SI joint involvement.  She has not participated in any conservative measures and does not wish to pursue physical therapy.  At this time we will place the patient in a chiropractic therapy program x 4 to 6 weeks or longer if needed.  Will also order a lumbar x-ray.  Will hold off on medication management or interventional approaches until she has exhausted conservative measures at this time.  All questions answered, patient is agreeable with plan.    My impressions and treatment recommendations were discussed in detail with the patient who verbalized understanding and had no further questions.  Discharge instructions were provided. I personally saw and examined the patient and I agree with the above discussed plan of care.    Orders Placed This Encounter   Procedures    Ambulatory referral to Chiropractic     Standing Status:   Future     Standing Expiration Date:   11/7/2025     Referral Priority:   Routine     Referral Type:   Chiropractic     Referral Reason:   Specialty Services Required     Requested Specialty:   Chiropractic Medicine     Number of Visits Requested:   1     Expiration Date:   11/7/2025     No orders of the defined types were placed in this encounter.      History of Present Illness:  Joanne Damon is a 49 y.o. female who presents for a follow up office visit in regards to Back Pain.   The patient’s current symptoms include left-sided low back, buttock pain currently rated 1-5 out of 10 pain isolated left-sided  low back and buttock of greater than 10 years duration.  Denies any significant inciting event or recent trauma.  Today reports moderate to severe pain that sometimes interferes with daily tibias.  Pain is constant 100% of the time that is worse in the evening.  Describes symptoms as shooting, sharp, dull aching pain.  Denies any significant lower extremity weakness or falls.  Does not use any durable medical equip fibrillation.  Symptoms are worse with standing, bending, walking, coughing, sneezing.  Reports moderate relief with heat.  Denies smoking, marijuana or alcohol use.  Currently using Tylenol and Motrin with some relief.  Presents today for initial evaluation.  I have personally reviewed and/or updated the patient's past medical history, past surgical history, family history, social history, current medications, allergies, and vital signs today.     Review of Systems   Respiratory:  Negative for shortness of breath.    Cardiovascular:  Negative for chest pain.   Gastrointestinal:  Negative for constipation, diarrhea, nausea and vomiting.   Musculoskeletal:  Positive for back pain, gait problem and joint swelling. Negative for arthralgias and myalgias.   Skin:  Negative for rash.   Neurological:  Negative for dizziness, seizures and weakness.   All other systems reviewed and are negative.      Patient Active Problem List   Diagnosis    Abnormal bleeding in menstrual cycle    Iron deficiency anemia    Mass of right wrist    Allergic rhinitis    Vertigo    Elevated blood pressure reading without diagnosis of hypertension    Hyperbilirubinemia    Thoracogenic scoliosis of thoracolumbar region    Family history of thyroid disease    Prediabetes    Seborrheic dermatitis    Numbness of left hand    Annual physical exam    Encounter for PPD skin test reading       Past Medical History:   Diagnosis Date    Abnormal Pap smear of cervix     Allergic rhinitis     Anemia     Dizziness     Miscarriage 9/2012    1  miscarriage when I had my tubes tied    Seasonal allergies     Vertigo        Past Surgical History:   Procedure Laterality Date    ARTHROTOMY ANKLE  2/5/2021    Procedure: ARTHROTOMY RADIAL CARPAL TUNNEL JOINT;  Surgeon: Alix Alcazar MD;  [facility];  Service: Orthopedics    BREAST EXCISIONAL BIOPSY Left 1994    benign    GYNECOLOGIC CRYOSURGERY      x2    [place] EXCISION GANGLION WRIST DORSAL/VOLAR PRIMARY Right 2/5/2021    Procedure: EXCISION GANGLION CYST;  Surgeon: Alix Alcazar MD;  [facility];  Service: Orthopedics    TUBAL LIGATION         Family History   Problem Relation Age of Onset    No Known Problems Mother     Sarcoidosis Father     Breast cancer Sister 45        3 years ago    No Known Problems Sister     No Known Problems Daughter     Breast cancer Maternal Grandmother 70        When she was 65    No Known Problems Maternal Grandfather     No Known Problems Paternal Grandmother     No Known Problems Paternal Grandfather     No Known Problems Maternal Aunt     No Known Problems Maternal Aunt     No Known Problems Maternal Aunt     No Known Problems Maternal Aunt     No Known Problems Maternal Aunt     No Known Problems Paternal Aunt     No Known Problems Paternal Aunt     No Known Problems Paternal Aunt     No Known Problems Paternal Aunt     No Known Problems Son     No Known Problems Son        Social History     Occupational History    Not on file   Tobacco Use    Smoking status: Never     Passive exposure: Never    Smokeless tobacco: Never   Vaping Use    Vaping status: Never Used   Substance and Sexual Activity    Alcohol use: No    Drug use: No    Sexual activity: Yes     Partners: Male     Birth control/protection: Implant, Female Sterilization       Current Outpatient Medications on File Prior to Visit   Medication Sig    ergocalciferol (VITAMIN D2) 50,000 units Take 1 capsule (50,000 Units total) by mouth once a week for 8 doses    levonorgestrel-ethinyl  "estradiol (Aviane) 0.1-20 MG-MCG per tablet Take 1 tablet by mouth daily    lisinopril (ZESTRIL) 20 mg tablet Take 1 tablet (20 mg total) by mouth daily     No current facility-administered medications on file prior to visit.       Allergies   Allergen Reactions    Shellfish-Derived Products - Food Allergy Throat Swelling       Physical Exam:    /76   Pulse 86   Resp 16   Ht 5' 7.75\" (1.721 m)   Wt 88.5 kg (195 lb)   BMI 29.87 kg/m²     Constitutional:normal, well developed, well nourished, alert, in no distress and non-toxic and no overt pain behavior.  Eyes:anicteric  HEENT:grossly intact  Neck:supple, symmetric, trachea midline and no masses   Pulmonary:even and unlabored  Cardiovascular:No edema or pitting edema present  Skin:Normal without rashes or lesions and well hydrated  Psychiatric:Mood and affect appropriate  Neurologic:Cranial Nerves II-XII grossly intact  Musculoskeletal:normal gait, tenderness to palpation left side lumbar paraspinals and distal to PSIS, positive Subhash finger left-sided, MMT 5-5 bilateral lower extremities, sensation grossly tact to light touch, DTRs within normal limits  POSITIVE Sacral compression testing left-sided  Negative Dilshad's test left-sided  POSITIVE thigh thrust left-sided    Imaging    "

## 2024-11-18 ENCOUNTER — OFFICE VISIT (OUTPATIENT)
Dept: FAMILY MEDICINE CLINIC | Facility: CLINIC | Age: 49
End: 2024-11-18
Payer: COMMERCIAL

## 2024-11-18 ENCOUNTER — CONSULT (OUTPATIENT)
Age: 49
End: 2024-11-18
Payer: COMMERCIAL

## 2024-11-18 VITALS
RESPIRATION RATE: 16 BRPM | SYSTOLIC BLOOD PRESSURE: 123 MMHG | DIASTOLIC BLOOD PRESSURE: 67 MMHG | WEIGHT: 196.6 LBS | TEMPERATURE: 96.7 F | BODY MASS INDEX: 29.8 KG/M2 | HEIGHT: 68 IN | OXYGEN SATURATION: 96 % | HEART RATE: 84 BPM

## 2024-11-18 VITALS
WEIGHT: 195 LBS | HEART RATE: 79 BPM | DIASTOLIC BLOOD PRESSURE: 80 MMHG | BODY MASS INDEX: 29.55 KG/M2 | HEIGHT: 68 IN | SYSTOLIC BLOOD PRESSURE: 130 MMHG

## 2024-11-18 DIAGNOSIS — M99.02 SEGMENTAL DYSFUNCTION OF THORACIC REGION: ICD-10-CM

## 2024-11-18 DIAGNOSIS — M79.18 MYOFASCIAL PAIN SYNDROME: ICD-10-CM

## 2024-11-18 DIAGNOSIS — M99.04 SEGMENTAL DYSFUNCTION OF SACRAL REGION: Primary | ICD-10-CM

## 2024-11-18 DIAGNOSIS — Z00.00 ANNUAL PHYSICAL EXAM: Primary | ICD-10-CM

## 2024-11-18 DIAGNOSIS — G89.29 CHRONIC LEFT-SIDED LOW BACK PAIN WITHOUT SCIATICA: ICD-10-CM

## 2024-11-18 DIAGNOSIS — M99.03 SEGMENTAL DYSFUNCTION OF LUMBAR REGION: ICD-10-CM

## 2024-11-18 DIAGNOSIS — I10 BENIGN ESSENTIAL HTN: ICD-10-CM

## 2024-11-18 DIAGNOSIS — R73.03 PREDIABETES: ICD-10-CM

## 2024-11-18 DIAGNOSIS — R01.1 HEART MURMUR, SYSTOLIC: ICD-10-CM

## 2024-11-18 DIAGNOSIS — M41.35 THORACOGENIC SCOLIOSIS OF THORACOLUMBAR REGION: ICD-10-CM

## 2024-11-18 DIAGNOSIS — M54.50 CHRONIC LEFT-SIDED LOW BACK PAIN WITHOUT SCIATICA: ICD-10-CM

## 2024-11-18 DIAGNOSIS — M41.25 OTHER IDIOPATHIC SCOLIOSIS, THORACOLUMBAR REGION: ICD-10-CM

## 2024-11-18 LAB — SL AMB POCT HEMOGLOBIN AIC: 5.9 (ref ?–6.5)

## 2024-11-18 PROCEDURE — 99203 OFFICE O/P NEW LOW 30 MIN: CPT | Performed by: CHIROPRACTOR

## 2024-11-18 PROCEDURE — 97110 THERAPEUTIC EXERCISES: CPT | Performed by: CHIROPRACTOR

## 2024-11-18 PROCEDURE — 99396 PREV VISIT EST AGE 40-64: CPT | Performed by: FAMILY MEDICINE

## 2024-11-18 PROCEDURE — 83036 HEMOGLOBIN GLYCOSYLATED A1C: CPT | Performed by: FAMILY MEDICINE

## 2024-11-18 PROCEDURE — 98941 CHIROPRACT MANJ 3-4 REGIONS: CPT | Performed by: CHIROPRACTOR

## 2024-11-18 NOTE — ASSESSMENT & PLAN NOTE
Saw pain management earlier this month and ordered imaging as well as 6 week of chiropractic therapy program

## 2024-11-18 NOTE — ASSESSMENT & PLAN NOTE
Started seeing a chiropractor for chronic lower back and new mid thoracic pain. UTD for flu. Encourage exercise as tolerated

## 2024-11-18 NOTE — PROGRESS NOTES
Adult Annual Physical  Name: Joanne Damon      : 1975      MRN: [id number]  Encounter Provider: Macarena Tristan MD  Encounter Date: 2024   Encounter department: [facility]    Assessment & Plan  Annual physical exam  Started seeing a chiropractor for chronic lower back and new mid thoracic pain. UTD for flu. Encourage exercise as tolerated        Benign essential HTN  Bp at goal on lisinopril 20 mg daily       Thoracogenic scoliosis of thoracolumbar region  Saw pain management earlier this month and ordered imaging as well as 6 week of chiropractic therapy program        Myofascial pain syndrome  Started chiropractic program for 4-6 weeks        Prediabetes  Measured 6.2 at the last visit. A1c today        Immunizations and preventive care screenings were discussed with patient today. Appropriate education was printed on patient's after visit summary.    Counseling:  Dental Health: discussed importance of regular tooth brushing, flossing, and dental visits.  Injury prevention: discussed safety/seat belts, safety helmets, smoke detectors, carbon monoxide detectors, and smoking near bedding or upholstery.  Exercise: the importance of regular exercise/physical activity was discussed. Recommend exercise 3-5 times per week for at least 30 minutes.          History of Present Illness     Adult Annual Physical:  Patient presents for annual physical. No acute concerns   Started chiropractor program today due to lower back pain .     Diet and Physical Activity:  - Diet/Nutrition: well balanced diet. will snack every now and then. Occasional speciality coffee at [facility]  - Exercise: no formal exercise. Plans to incorporate core exercises    General Health:    - Hearing: normal hearing bilateral ears.  - Vision: wears glasses and vision problems. needs readers and goes yearly  - Dental: regular dental visits. Filling that did go well and had to be seen 3 x    /GYN Health:    -  Contraception: oral contraceptives. light periods. no longer on IUD and taking this for right gorin pain      Review of Systems  Medical History Reviewed by provider this encounter:     .  Past Medical History   Past Medical History:   Diagnosis Date   • Abnormal Pap smear of cervix    • Allergic rhinitis    • Anemia    • Dizziness    • Miscarriage 9/2012    1 miscarriage when I had my tubes tied   • Seasonal allergies    • Vertigo      Past Surgical History:   Procedure Laterality Date   • ARTHROTOMY ANKLE  2/5/2021    Procedure: ARTHROTOMY RADIAL CARPAL TUNNEL JOINT;  Surgeon: Alix Alcazar MD;  [facility];  Service: Orthopedics   • BREAST EXCISIONAL BIOPSY Left 1994    benign   • GYNECOLOGIC CRYOSURGERY      x2   • [place] EXCISION GANGLION WRIST DORSAL/VOLAR PRIMARY Right 2/5/2021    Procedure: EXCISION GANGLION CYST;  Surgeon: Alix Alcazar MD;  [facility];  Service: Orthopedics   • TUBAL LIGATION       Family History   Problem Relation Age of Onset   • No Known Problems Mother    • Sarcoidosis Father    • Breast cancer Sister 45        3 years ago   • No Known Problems Sister    • No Known Problems Daughter    • Breast cancer Maternal Grandmother 70        When she was 65   • No Known Problems Maternal Grandfather    • No Known Problems Paternal Grandmother    • No Known Problems Paternal Grandfather    • No Known Problems Maternal Aunt    • No Known Problems Maternal Aunt    • No Known Problems Maternal Aunt    • No Known Problems Maternal Aunt    • No Known Problems Maternal Aunt    • No Known Problems Paternal Aunt    • No Known Problems Paternal Aunt    • No Known Problems Paternal Aunt    • No Known Problems Paternal Aunt    • No Known Problems Son    • No Known Problems Son       reports that she has never smoked. She has never been exposed to tobacco smoke. She has never used smokeless tobacco. She reports that she does not drink alcohol and does not use drugs.  Current  Outpatient Medications on File Prior to Visit   Medication Sig Dispense Refill   • ergocalciferol (VITAMIN D2) 50,000 units Take 1 capsule (50,000 Units total) by mouth once a week for 8 doses 8 capsule 0   • levonorgestrel-ethinyl estradiol (Aviane) 0.1-20 MG-MCG per tablet Take 1 tablet by mouth daily 84 tablet 0   • lisinopril (ZESTRIL) 20 mg tablet Take 1 tablet (20 mg total) by mouth daily 100 tablet 1     No current facility-administered medications on file prior to visit.     Allergies   Allergen Reactions   • Shellfish-Derived Products - Food Allergy Throat Swelling      Current Outpatient Medications on File Prior to Visit   Medication Sig Dispense Refill   • ergocalciferol (VITAMIN D2) 50,000 units Take 1 capsule (50,000 Units total) by mouth once a week for 8 doses 8 capsule 0   • levonorgestrel-ethinyl estradiol (Aviane) 0.1-20 MG-MCG per tablet Take 1 tablet by mouth daily 84 tablet 0   • lisinopril (ZESTRIL) 20 mg tablet Take 1 tablet (20 mg total) by mouth daily 100 tablet 1     No current facility-administered medications on file prior to visit.        Objective   There were no vitals taken for this visit.    Physical Exam  Vitals and nursing note reviewed.   Constitutional:       General: She is not in acute distress.     Appearance: She is well-developed.   HENT:      Head: Normocephalic and atraumatic.      Right Ear: Tympanic membrane normal.      Left Ear: Tympanic membrane normal.   Eyes:      Extraocular Movements: Extraocular movements intact.      Conjunctiva/sclera: Conjunctivae normal.   Cardiovascular:      Rate and Rhythm: Normal rate and regular rhythm.      Heart sounds: Murmur (2/6 murmur) heard.   Pulmonary:      Effort: Pulmonary effort is normal. No respiratory distress.      Breath sounds: Normal breath sounds.   Abdominal:      General: There is no distension.      Palpations: Abdomen is soft. There is no mass.      Tenderness: There is no abdominal tenderness. There is no  guarding.      Hernia: No hernia is present.   Musculoskeletal:         General: No swelling.   Lymphadenopathy:      Cervical: No cervical adenopathy.   Skin:     General: Skin is warm and dry.      Capillary Refill: Capillary refill takes less than 2 seconds.   Neurological:      Mental Status: She is alert and oriented to person, place, and time.   Psychiatric:         Mood and Affect: Mood normal.         Behavior: Behavior normal.

## 2024-11-18 NOTE — PROGRESS NOTES
Initial date of service: 11/18/24    Diagnoses and all orders for this visit:    Myofascial pain syndrome  -     Ambulatory referral to Chiropractic    Chronic left-sided low back pain without sciatica  -     Ambulatory referral to Chiropractic    No red flags, radiculopathy or neurologic deficit appreciated clinically. Pt's symptoms, imaging and exam findings consistent with mechanical back pain secondary to repetitive st/sp injury in the setting of mild to moderate levoscoliosis, exacerbated by postural/ergonomic stressors. Pt responded well to flexion biased stretches and manual mobilization of the affected spinal and myofascial tissues with increased ROM; trial of conservative tx recommended consisting of IASTM, stretching, graded mobilization/manipulation of the affected spinal and myofascial jt dysfunction, postural/ergonomic education and take home stretches/exercises.   If symptoms fail to improve with short trial of conservative care, appropriate imaging and referral will be coordinated.  Spent greater than 30 min c pt discussing hx, pe, ddx, tx options and reviewing notes/imaging today    TREATMENT: 09523, 30933  Fear avoidance behavior discussion; encouraged and reassured pt that natural course of condition is to improve over time with adherence to tx plan and home care strategies. Home care recommendations: avoid bed rest, walk (but avoid trails and uneven surfaces), gradual return to activity to tolerance (avoid anything that peripheralizes symptoms), call if symptoms peripheralize, worsen, or neurologic deficit progresses. Ther-ex: IASTM; discussed post procedure soreness and/or ecchymosis for up to 36 hrs, applied to affected mm hypertonicities; supine hamstring stretch, supine gluteal stretch, side laying QL stretch, single knee to chest stretch, hip flexor pin-and-stretch, transitional mvmt education, abdominal bracing; greater than 15 min spent performing above mentioned ther-ex to improve  ROM/flexibility. Thoracic mobilization/manipulation: prone P-A mob, supine A-P manip; Lumbar mobilization/manipulation: diversified side laying graded HVLA, flexion-traction; SIJ Manipulation/Mobilization: R SIJ HVLA - long axis distraction, keenan drop table maneuver to affected SIJ    HPI:  Joanne Damon is a 49 y.o. female  Chief Complaint   Patient presents with    Lower Back - Pain    Middle Back - Pain     Pt presents for consult and tx, referred by Dr Ralph, for chronic back pain related to moderate thoracolumbar levoscoliosis. Pt has managed with stretching and muscle relaxants in past, but symptoms worsening and spreading from lumbar region to thoracic    Back Pain  This is a chronic problem. The current episode started more than 1 year ago. The problem occurs daily. The problem has been gradually worsening since onset. The pain is present in the gluteal, lumbar spine, sacro-iliac and thoracic spine. The quality of the pain is described as aching. The pain does not radiate. Worse during: worse in am. The symptoms are aggravated by bending, standing and twisting (transtional mvmts; palliative includes laying right side, heat, massage). Pertinent negatives include no bowel incontinence.     Past Medical History:   Diagnosis Date    Abnormal Pap smear of cervix     Allergic rhinitis     Anemia     Dizziness     Miscarriage 9/2012    1 miscarriage when I had my tubes tied    Seasonal allergies     Vertigo       Past Surgical History:   Procedure Laterality Date    ARTHROTOMY ANKLE  2/5/2021    Procedure: ARTHROTOMY RADIAL CARPAL TUNNEL JOINT;  Surgeon: Alix Alcazar MD;  [facility];  Service: Orthopedics    BREAST EXCISIONAL BIOPSY Left 1994    benign    GYNECOLOGIC CRYOSURGERY      x2    [place] EXCISION GANGLION WRIST DORSAL/VOLAR PRIMARY Right 2/5/2021    Procedure: EXCISION GANGLION CYST;  Surgeon: Alix Alcazar MD;  [facility];  Service: Orthopedics    TUBAL LIGATION       The  following portions of the patient's history were reviewed and updated as appropriate: allergies, past family history, past medical history, past social history, past surgical history, and problem list.  Review of Systems   Gastrointestinal:  Negative for bowel incontinence.   Musculoskeletal:  Positive for back pain.     Physical Exam  Eyes:      Extraocular Movements: Extraocular movements intact.   Cardiovascular:      Pulses: Normal pulses.   Abdominal:      General: There is no distension.      Tenderness: There is no abdominal tenderness.   Musculoskeletal:      Thoracic back: Spasms and tenderness present. Decreased range of motion.      Lumbar back: Spasms and tenderness present. Decreased range of motion. Negative right straight leg raise test and negative left straight leg raise test.        Back:       Comments: Pnful and limited in Brot, Llf, Ext   Skin:     General: Skin is warm and dry.   Neurological:      Mental Status: She is alert and oriented to person, place, and time.      Cranial Nerves: Cranial nerves 2-12 are intact.      Sensory: Sensation is intact.      Motor: Motor function is intact.      Coordination: Coordination is intact.      Gait: Gait is intact.      Deep Tendon Reflexes: Babinski sign absent on the right side. Babinski sign absent on the left side.      Reflex Scores:       Patellar reflexes are 2+ on the right side and 2+ on the left side.       Achilles reflexes are 2+ on the right side and 2+ on the left side.  Psychiatric:         Mood and Affect: Mood normal.         Behavior: Behavior normal.   SOFT TISSUE ASSESSMENT Hypertonicity and tenderness palpated B T10-S1 erector spinae, L hip flexor, B glute med/min, L QL, B hamstring JOINT RESTRICTIONS: T10-S1 and R SIJ ORTHO: SI jt point tenderness: +; Raiza unremarkable for centralization/peripheralization; aruna, iliac compression, thigh thrust elicit lbp in R SIJ; prone femoral nerve stretch neg for upper lumbar neural  tension, elicits R SIJ stiffness; sitting root elicits no lbp on R/L; slump test elicits no neural tension R/L    No follow-ups on file.

## 2024-12-09 ENCOUNTER — PROCEDURE VISIT (OUTPATIENT)
Age: 49
End: 2024-12-09
Payer: COMMERCIAL

## 2024-12-09 VITALS
WEIGHT: 196 LBS | SYSTOLIC BLOOD PRESSURE: 133 MMHG | HEIGHT: 68 IN | DIASTOLIC BLOOD PRESSURE: 85 MMHG | HEART RATE: 85 BPM | BODY MASS INDEX: 29.7 KG/M2

## 2024-12-09 DIAGNOSIS — M54.50 CHRONIC LEFT-SIDED LOW BACK PAIN WITHOUT SCIATICA: ICD-10-CM

## 2024-12-09 DIAGNOSIS — M41.25 OTHER IDIOPATHIC SCOLIOSIS, THORACOLUMBAR REGION: ICD-10-CM

## 2024-12-09 DIAGNOSIS — M99.02 SEGMENTAL DYSFUNCTION OF THORACIC REGION: ICD-10-CM

## 2024-12-09 DIAGNOSIS — M79.18 MYOFASCIAL PAIN SYNDROME: Primary | ICD-10-CM

## 2024-12-09 DIAGNOSIS — M99.04 SEGMENTAL DYSFUNCTION OF SACRAL REGION: ICD-10-CM

## 2024-12-09 DIAGNOSIS — M99.03 SEGMENTAL DYSFUNCTION OF LUMBAR REGION: ICD-10-CM

## 2024-12-09 DIAGNOSIS — G89.29 CHRONIC LEFT-SIDED LOW BACK PAIN WITHOUT SCIATICA: ICD-10-CM

## 2024-12-09 PROCEDURE — 97110 THERAPEUTIC EXERCISES: CPT | Performed by: CHIROPRACTOR

## 2024-12-09 PROCEDURE — 98941 CHIROPRACT MANJ 3-4 REGIONS: CPT | Performed by: CHIROPRACTOR

## 2024-12-09 NOTE — PROGRESS NOTES
Initial date of service: 11/18/24    Diagnoses and all orders for this visit:    Myofascial pain syndrome    Chronic left-sided low back pain without sciatica    Segmental dysfunction of sacral region    Segmental dysfunction of lumbar region    Segmental dysfunction of thoracic region    Other idiopathic scoliosis, thoracolumbar region    Pt improved with reduced pain and increased ROM    TREATMENT: 56148, 17518  Ther-ex: IASTM; discussed post procedure soreness and/or ecchymosis for up to 36 hrs, applied to affected mm hypertonicities; supine hamstring stretch, supine gluteal stretch, side laying QL stretch, single knee to chest stretch, hip flexor pin-and-stretch, transitional mvmt education, abdominal bracing; greater than 15 min spent performing above mentioned ther-ex to improve ROM/flexibility. Thoracic mobilization/manipulation: prone P-A mob, supine A-P manip; Lumbar mobilization/manipulation: diversified side laying graded HVLA, flexion-traction; SIJ Manipulation/Mobilization: R SIJ HVLA - long axis distraction, keenan drop table maneuver to affected SIJ    HPI:  Joanne Damon is a 49 y.o. female  Chief Complaint   Patient presents with    Back Pain     Mid to lower lumbar pain is better. Pain score 1        Pt presents for tx, referred by Dr Ralph, for chronic back pain related to moderate thoracolumbar levoscoliosis. Pt has managed with stretching and muscle relaxants in past, but symptoms worsening and spreading from lumbar region to thoracic  12/9 Pt reports home stretches helping    Back Pain  This is a chronic problem. The current episode started more than 1 year ago. The problem occurs daily. The problem has been gradually worsening since onset. The pain is present in the gluteal, lumbar spine, sacro-iliac and thoracic spine. The quality of the pain is described as aching. The pain does not radiate. Worse during: worse in am. The symptoms are aggravated by bending, standing and twisting  (transtional mvmts; palliative includes laying right side, heat, massage). Pertinent negatives include no bowel incontinence.     Past Medical History:   Diagnosis Date    Abnormal Pap smear of cervix     Allergic rhinitis     Anemia     Dizziness     Miscarriage 9/2012    1 miscarriage when I had my tubes tied    Seasonal allergies     Vertigo       Past Surgical History:   Procedure Laterality Date    ARTHROTOMY ANKLE  2/5/2021    Procedure: ARTHROTOMY RADIAL CARPAL TUNNEL JOINT;  Surgeon: Alix Alcazar MD;  [facility];  Service: Orthopedics    BREAST EXCISIONAL BIOPSY Left 1994    benign    GYNECOLOGIC CRYOSURGERY      x2    [place] EXCISION GANGLION WRIST DORSAL/VOLAR PRIMARY Right 2/5/2021    Procedure: EXCISION GANGLION CYST;  Surgeon: Alix Alcazar MD;  [facility]: [facility];  Service: Orthopedics    TUBAL LIGATION       The following portions of the patient's history were reviewed and updated as appropriate: allergies, past family history, past medical history, past social history, past surgical history, and problem list.  Review of Systems   Gastrointestinal:  Negative for bowel incontinence.   Musculoskeletal:  Positive for back pain.     Physical Exam  Musculoskeletal:      Thoracic back: Spasms and tenderness present. Decreased range of motion.      Lumbar back: Spasms and tenderness present. Decreased range of motion. Negative right straight leg raise test and negative left straight leg raise test.        Back:       Comments: Pnful and limited in Brot, Llf, Ext   Skin:     General: Skin is warm and dry.   Neurological:      Mental Status: She is alert and oriented to person, place, and time.      Gait: Gait is intact.   Psychiatric:         Mood and Affect: Mood normal.         Behavior: Behavior normal.     SOFT TISSUE ASSESSMENT Hypertonicity and tenderness palpated B T10-S1 erector spinae, L hip flexor, B glute med/min, L QL, B hamstring JOINT RESTRICTIONS: T10-S1 and R SIJ      Return in about 1 week (around 12/16/2024) for Next scheduled follow up.

## 2024-12-30 ENCOUNTER — TELEPHONE (OUTPATIENT)
Age: 49
End: 2024-12-30

## 2024-12-30 NOTE — TELEPHONE ENCOUNTER
Caller: sailaja Rubio    Doctor: Loree    Reason for call: pt called stating she has gone to the chiropractor for 2 visits and they are basically giving her instructions for things to do at home.  Pt would like to know what the next steps are?  Will she be able to get an MRI?    Call back#: [phone]

## 2024-12-30 NOTE — TELEPHONE ENCOUNTER
S/w pt, states she has been doing chiro treatment for approx 2 weeks with some relief, however, moderate symptoms remain at this time. RN advised per CON notes, rec 4-6 weeks of chiro treatment, pt verbalized understanding. Pt asked if xray is recommended, states it had been mentioned during previous conversation but orders not yet placed. RN advised M[facility]NE[facility] TENA at this time but update would be sent for review and recommendation, pt verbalized understanding.

## 2025-01-02 DIAGNOSIS — G89.29 CHRONIC LEFT-SIDED LOW BACK PAIN, UNSPECIFIED WHETHER SCIATICA PRESENT: Primary | ICD-10-CM

## 2025-01-02 DIAGNOSIS — M54.50 CHRONIC LEFT-SIDED LOW BACK PAIN, UNSPECIFIED WHETHER SCIATICA PRESENT: Primary | ICD-10-CM

## 2025-01-09 NOTE — TELEPHONE ENCOUNTER
Attempted to contact pt. Detailed VMMLOM.Ok per medical communication consent on file) Provided with CB# and OH.

## 2025-01-10 DIAGNOSIS — I10 BENIGN ESSENTIAL HTN: ICD-10-CM

## 2025-01-13 ENCOUNTER — HOSPITAL ENCOUNTER (OUTPATIENT)
Dept: RADIOLOGY | Facility: HOSPITAL | Age: 50
Discharge: HOME | End: 2025-01-13
Payer: COMMERCIAL

## 2025-01-13 DIAGNOSIS — G89.29 CHRONIC LEFT-SIDED LOW BACK PAIN, UNSPECIFIED WHETHER SCIATICA PRESENT: ICD-10-CM

## 2025-01-13 DIAGNOSIS — M54.50 CHRONIC LEFT-SIDED LOW BACK PAIN, UNSPECIFIED WHETHER SCIATICA PRESENT: ICD-10-CM

## 2025-01-13 PROCEDURE — 72110 X-RAY EXAM L-2 SPINE 4/>VWS: CPT

## 2025-01-13 RX ORDER — LISINOPRIL 20 MG/1
20 TABLET ORAL DAILY
Qty: 100 TABLET | Refills: 1 | Status: SHIPPED | OUTPATIENT
Start: 2025-01-13

## 2025-02-18 ENCOUNTER — APPOINTMENT (OUTPATIENT)
Dept: LAB | Facility: CLINIC | Age: 50
End: 2025-02-18
Payer: COMMERCIAL

## 2025-02-18 DIAGNOSIS — Z01.84 IMMUNITY STATUS TESTING: ICD-10-CM

## 2025-02-18 PROCEDURE — 86706 HEP B SURFACE ANTIBODY: CPT

## 2025-02-18 PROCEDURE — 86735 MUMPS ANTIBODY: CPT

## 2025-02-18 PROCEDURE — 86787 VARICELLA-ZOSTER ANTIBODY: CPT

## 2025-02-18 PROCEDURE — 36415 COLL VENOUS BLD VENIPUNCTURE: CPT

## 2025-02-18 PROCEDURE — 86762 RUBELLA ANTIBODY: CPT

## 2025-02-18 PROCEDURE — 86765 RUBEOLA ANTIBODY: CPT

## 2025-02-19 LAB
HBV SURFACE AB SER-ACNC: 202 MIU/ML
VZV IGG SER QL IA: 14.2 S/CO
VZV IGG SER QL IA: POSITIVE

## 2025-02-20 ENCOUNTER — RESULTS FOLLOW-UP (OUTPATIENT)
Dept: FAMILY MEDICINE CLINIC | Facility: CLINIC | Age: 50
End: 2025-02-20

## 2025-02-20 LAB
MEV IGG SER IA-ACNC: >300 AU/ML
MUV IGG SER IA-ACNC: 89.3 AU/ML
RUBV IGG SERPL IA-ACNC: 7.03 INDEX

## 2025-02-26 DIAGNOSIS — Z30.011 ENCOUNTER FOR INITIAL PRESCRIPTION OF CONTRACEPTIVE PILLS: ICD-10-CM

## 2025-02-27 RX ORDER — LEVONORGESTREL/ETHIN.ESTRADIOL 0.1-0.02MG
1 TABLET ORAL DAILY
Qty: 84 TABLET | Refills: 0 | Status: SHIPPED | OUTPATIENT
Start: 2025-02-27

## 2025-02-28 ENCOUNTER — OFFICE VISIT (OUTPATIENT)
Dept: FAMILY MEDICINE CLINIC | Facility: CLINIC | Age: 50
End: 2025-02-28
Payer: COMMERCIAL

## 2025-02-28 VITALS
RESPIRATION RATE: 16 BRPM | WEIGHT: 196.2 LBS | HEART RATE: 75 BPM | DIASTOLIC BLOOD PRESSURE: 90 MMHG | HEIGHT: 67 IN | BODY MASS INDEX: 30.79 KG/M2 | TEMPERATURE: 96.4 F | SYSTOLIC BLOOD PRESSURE: 136 MMHG | OXYGEN SATURATION: 97 %

## 2025-02-28 DIAGNOSIS — R73.03 PREDIABETES: ICD-10-CM

## 2025-02-28 DIAGNOSIS — R68.89 FLU-LIKE SYMPTOMS: Primary | ICD-10-CM

## 2025-02-28 DIAGNOSIS — E55.9 VITAMIN D INSUFFICIENCY: ICD-10-CM

## 2025-02-28 DIAGNOSIS — I10 BENIGN ESSENTIAL HTN: ICD-10-CM

## 2025-02-28 DIAGNOSIS — D50.9 IRON DEFICIENCY ANEMIA, UNSPECIFIED IRON DEFICIENCY ANEMIA TYPE: ICD-10-CM

## 2025-02-28 PROCEDURE — 87636 SARSCOV2 & INF A&B AMP PRB: CPT | Performed by: FAMILY MEDICINE

## 2025-02-28 PROCEDURE — 99214 OFFICE O/P EST MOD 30 MIN: CPT | Performed by: FAMILY MEDICINE

## 2025-02-28 RX ORDER — OSELTAMIVIR PHOSPHATE 75 MG/1
75 CAPSULE ORAL EVERY 12 HOURS SCHEDULED
Qty: 10 CAPSULE | Refills: 0 | Status: SHIPPED | OUTPATIENT
Start: 2025-02-28 | End: 2025-03-05

## 2025-02-28 NOTE — PROGRESS NOTES
Name: Joanne Damon      : 1975      MRN: [id number]  Encounter Provider: Macarena Tristan MD  Encounter Date: 2025   Encounter department: [facility]  :  Assessment & Plan  Flu-like symptoms  Symptoms consistent with flu. Will treat with tamiflu. PCR ordered to confirm diagnosis. Supportive care  ( fluids, rest, warm salt water gargles, tea, and OTC colg and flu medications). Call precautions ( high fevers, respiratory distress, or dehydration). If flu negative, d/c tamiflu   Orders:    oseltamivir (TAMIFLU) 75 mg capsule; Take 1 capsule (75 mg total) by mouth every 12 (twelve) hours for 5 days    Covid/Flu- Office Collect Normal    Benign essential HTN  Mildly elevated in the setting of recent illness. Labs to be done prior to the next appt   Orders:    Comprehensive metabolic panel; Future    Albumin / creatinine urine ratio    Iron deficiency anemia, unspecified iron deficiency anemia type  On iron. Recheck levels   Orders:    CBC and differential; Future    Iron Panel (Includes Ferritin, Iron Sat%, Iron, and TIBC); Future    Prediabetes  Lab Results   Component Value Date    HGBA1C 5.9 2024     Improved. Continue low carb diet. FBW ordered   Orders:    Lipid panel; Future    Comprehensive metabolic panel; Future    Vitamin D insufficiency  Measured 18 on . Due for repeat labs. Was on high dose Vitamin D and currently on maintenance dose .   Orders:    Vitamin D 25 hydroxy; Future           History of Present Illness   Presents with nasal congestion, left ear pain, bod aches since Wednesday. Symptoms came on suddenly. Denies GI symptoms, fevers, or SOB.   Did have chills, decreased appetite, and chest pain yesterday. Cough is nonproductive.  She also reports pain in the lower back. Robaxin helps but has to take consistently in order to benefit. Has an appt with pain management and will discuss possible injections       Sore Throat   This is a new problem. The current  "episode started yesterday. The problem has been gradually worsening. The pain is worse on the left side. There has been no fever. The pain is at a severity of 3/10. The pain is mild. Associated symptoms include congestion, coughing, ear pain, headaches and a plugged ear sensation. Pertinent negatives include no abdominal pain, diarrhea, drooling, ear discharge, hoarse voice, neck pain, shortness of breath, stridor, swollen glands, trouble swallowing or vomiting.     Review of Systems   HENT:  Positive for congestion, ear pain and sore throat. Negative for drooling, ear discharge, hoarse voice and trouble swallowing.    Respiratory:  Positive for cough. Negative for shortness of breath and stridor.    Gastrointestinal:  Negative for abdominal pain, diarrhea and vomiting.   Musculoskeletal:  Negative for neck pain.   Neurological:  Positive for headaches.       Objective   /90 (BP Location: Left arm, Patient Position: Sitting, Cuff Size: Standard)   Pulse 75   Temp (!) 96.4 °F (35.8 °C) (Tympanic)   Resp 16   Ht 5' 7\" (1.702 m)   Wt 89 kg (196 lb 3.2 oz)   SpO2 97%   BMI 30.73 kg/m²      Physical Exam  Constitutional:       General: She is not in acute distress.     Appearance: She is not ill-appearing or toxic-appearing.      Comments: Tired appearing    HENT:      Head: Normocephalic and atraumatic.      Right Ear: Tympanic membrane normal.      Left Ear: Tympanic membrane normal.      Nose: Congestion present.      Mouth/Throat:      Pharynx: No oropharyngeal exudate or posterior oropharyngeal erythema.   Eyes:      Extraocular Movements: Extraocular movements intact.   Cardiovascular:      Rate and Rhythm: Normal rate and regular rhythm.      Heart sounds: No murmur heard.  Pulmonary:      Effort: Pulmonary effort is normal. No respiratory distress.      Breath sounds: Normal breath sounds. No stridor. No wheezing, rhonchi or rales.   Abdominal:      General: There is no distension.      Palpations: " Abdomen is soft. There is no mass.      Tenderness: There is no abdominal tenderness. There is no guarding or rebound.      Hernia: No hernia is present.   Lymphadenopathy:      Cervical: Cervical adenopathy present.   Skin:     General: Skin is warm.      Capillary Refill: Capillary refill takes 2 to 3 seconds.      Coloration: Skin is not pale.   Neurological:      Mental Status: She is oriented to person, place, and time.   Psychiatric:         Mood and Affect: Mood normal.         Behavior: Behavior normal.         Thought Content: Thought content normal.

## 2025-02-28 NOTE — ASSESSMENT & PLAN NOTE
Lab Results   Component Value Date    HGBA1C 5.9 11/18/2024     Improved. Continue low carb diet. FBW ordered   Orders:    Lipid panel; Future    Comprehensive metabolic panel; Future

## 2025-02-28 NOTE — ASSESSMENT & PLAN NOTE
On iron. Recheck levels   Orders:    CBC and differential; Future    Iron Panel (Includes Ferritin, Iron Sat%, Iron, and TIBC); Future

## 2025-03-02 LAB
FLUAV RNA RESP QL NAA+PROBE: NEGATIVE
FLUBV RNA RESP QL NAA+PROBE: NEGATIVE
SARS-COV-2 RNA RESP QL NAA+PROBE: NEGATIVE

## 2025-03-05 ENCOUNTER — RESULTS FOLLOW-UP (OUTPATIENT)
Dept: FAMILY MEDICINE CLINIC | Facility: CLINIC | Age: 50
End: 2025-03-05

## 2025-03-11 ENCOUNTER — OFFICE VISIT (OUTPATIENT)
Dept: PAIN MEDICINE | Facility: CLINIC | Age: 50
End: 2025-03-11
Payer: COMMERCIAL

## 2025-03-11 VITALS
HEIGHT: 67 IN | DIASTOLIC BLOOD PRESSURE: 84 MMHG | BODY MASS INDEX: 30.76 KG/M2 | RESPIRATION RATE: 14 BRPM | HEART RATE: 72 BPM | SYSTOLIC BLOOD PRESSURE: 127 MMHG | WEIGHT: 196 LBS

## 2025-03-11 DIAGNOSIS — M51.369 DEGENERATION OF INTERVERTEBRAL DISC OF LUMBAR REGION WITHOUT DISCOGENIC BACK PAIN OR LOWER EXTREMITY PAIN: Primary | ICD-10-CM

## 2025-03-11 PROCEDURE — 99214 OFFICE O/P EST MOD 30 MIN: CPT | Performed by: PHYSICAL MEDICINE & REHABILITATION

## 2025-03-11 NOTE — PROGRESS NOTES
Assessment:  1. Degeneration of intervertebral disc of lumbar region without discogenic back pain or lower extremity pain        Plan:  Ms. Damon is a pleasant 49-year-old female significant past medical history of iron deficiency anemia presents to [facility] for follow-up and reevaluation regarding ongoing low back pain.  Since last visit November 2024 we have put her in a chiropractic therapy program which she reports provided temporary relief in her pain.  After several visits they provided her with a list of at home chiropractic therapy guided exercises that she continues with for at least 6 weeks in the last 6 months with minimal improvements in her pain.  At this time imaging will be beneficial and warranted and a lumbar MRI without contrast has been ordered.  Pending imaging results would consider interventional approaches but for now we will await MRI results.    My impressions and treatment recommendations were discussed in detail with the patient who verbalized understanding and had no further questions.  Discharge instructions were provided. I personally saw and examined the patient and I agree with the above discussed plan of care.    Orders Placed This Encounter   Procedures    MRI lumbar spine wo contrast     Standing Status:   Future     Expected Date:   3/11/2025     Expiration Date:   3/11/2029     Scheduling Instructions:      There is no preparation for this test. Please leave your jewelry and valuables at home, wedding rings are the exception. All patients will be required to change into a hospital gown and pants.  Street clothes are not permitted in the MRI.  Magnetic nail polish must be removed prior to arrival for your test. Please bring your insurance cards, a form of photo ID and a list of your medications with you. Arrive 15 minutes prior to your appointment time in order to register. Please bring any prior CT or MRI studies of this area that were not performed  "at a [facility].            To schedule this appointment, please contact Central Scheduling at [phone].            Prior to your appointment, please make sure you complete the MRI Screening Form when you e-Check in for your appointment. This will be available starting 7 days before your appointment in [employer]. You may receive an e-mail with an activation code if you do not have a [employer] account. If you do not have access to a device, we will complete your screening at your appointment.     Reason for Exam:   low back pain without sciatica     Is the patient pregnant?:   No     For OP exams needed \"URGENT\", choose the appropriate timeframe below and call Central Scheduling at [phone]. No need to speak with a Radiologist.:   Not URGENT     What is the patient's sedation requirement?:   No Sedation     Does the patient need medication for Claustrophobia? If yes, order medication at this point.:   No     Does the patient wear a life vest, have an implanted cardiac device, a stimulation device, a sleep apnea stimulator, or a breast tissue expansion device?:   No     Release to patient through [employer]:   Immediate     No orders of the defined types were placed in this encounter.      History of Present Illness:  Joanne Damon is a 49 y.o. female who presents for a follow up office visit in regards to Back Pain (Low back pain).   The patient’s current symptoms include isolated left sided back pain currently rated 1-9 out of 10 is described as a constant sharp, pressure-like, throbbing pain.  She has completed greater than 6 weeks of chiropractic therapy in the last 6 months.  Presents today for follow-up and reevaluation.      I have personally reviewed and/or updated the patient's past medical history, past surgical history, family history, social history, current medications, allergies, and vital signs today.     Review of Systems   Respiratory:  Negative for shortness of breath.    Cardiovascular: "  Negative for chest pain.   Gastrointestinal:  Negative for constipation, diarrhea, nausea and vomiting.   Musculoskeletal:  Positive for back pain. Negative for arthralgias, gait problem, joint swelling and myalgias.   Skin:  Negative for rash.   Neurological:  Negative for dizziness, seizures and weakness.   All other systems reviewed and are negative.      Patient Active Problem List   Diagnosis    Abnormal bleeding in menstrual cycle    Iron deficiency anemia    Mass of right wrist    Allergic rhinitis    Vertigo    Elevated blood pressure reading without diagnosis of hypertension    Hyperbilirubinemia    Thoracogenic scoliosis of thoracolumbar region    Family history of thyroid disease    Prediabetes    Seborrheic dermatitis    Numbness of left hand    Annual physical exam    Encounter for PPD skin test reading       Past Medical History:   Diagnosis Date    Abnormal Pap smear of cervix     Allergic rhinitis     Anemia     Dizziness     Hypertension 2023    Miscarriage 9/2012    1 miscarriage when I had my tubes tied    Seasonal allergies     Vertigo        Past Surgical History:   Procedure Laterality Date    ARTHROTOMY ANKLE  02/05/2021    Procedure: ARTHROTOMY RADIAL CARPAL TUNNEL JOINT;  Surgeon: Alix Alcazar MD;  [facility];  Service: Orthopedics    BREAST EXCISIONAL BIOPSY Left 1994    benign    BREAST SURGERY  1994    Lump to left breast removed B9    GYNECOLOGIC CRYOSURGERY      x2    MI EXCISION GANGLION WRIST DORSAL/VOLAR PRIMARY Right 02/05/2021    Procedure: EXCISION GANGLION CYST;  Surgeon: Alix Alcazar MD;  [facility];  Service: Orthopedics    TUBAL LIGATION         Family History   Problem Relation Age of Onset    Hypertension Mother     Diabetes Mother     Thyroid disease Mother         Graves Disease    Sarcoidosis Father     Breast cancer Sister 45        3 years ago    Hypertension Sister     No Known Problems Sister     No Known Problems Daughter     Breast  "cancer Maternal Grandmother 70        When she was 65    Diabetes Maternal Grandmother     No Known Problems Maternal Grandfather     No Known Problems Paternal Grandmother     No Known Problems Paternal Grandfather     No Known Problems Maternal Aunt     No Known Problems Maternal Aunt     No Known Problems Maternal Aunt     No Known Problems Maternal Aunt     No Known Problems Maternal Aunt     No Known Problems Paternal Aunt     No Known Problems Paternal Aunt     No Known Problems Paternal Aunt     No Known Problems Paternal Aunt     No Known Problems Son     No Known Problems Son     Hypertension Brother     Diabetes Brother     Asthma Brother        Social History     Occupational History    Not on file   Tobacco Use    Smoking status: Never     Passive exposure: Never    Smokeless tobacco: Never   Vaping Use    Vaping status: Never Used   Substance and Sexual Activity    Alcohol use: No    Drug use: No    Sexual activity: Yes     Partners: Male     Birth control/protection: I.U.D., Female Sterilization       Current Outpatient Medications on File Prior to Visit   Medication Sig    levonorgestrel-ethinyl estradiol (Aviane) 0.1-20 MG-MCG per tablet Take 1 tablet by mouth daily    lisinopril (ZESTRIL) 20 mg tablet Take 1 tablet (20 mg total) by mouth daily     No current facility-administered medications on file prior to visit.       Allergies   Allergen Reactions    Shellfish-Derived Products - Food Allergy Throat Swelling       Physical Exam:    /84 (BP Location: Right arm, Patient Position: Sitting, Cuff Size: Standard)   Pulse 72   Resp 14   Ht 5' 7\" (1.702 m)   Wt 88.9 kg (196 lb)   BMI 30.70 kg/m²     Constitutional:normal, well developed, well nourished, alert, in no distress and non-toxic and no overt pain behavior.  Eyes:anicteric  HEENT:grossly intact  Neck:supple, symmetric, trachea midline and no masses   Pulmonary:even and unlabored  Cardiovascular:No edema or pitting edema " present  Skin:Normal without rashes or lesions and well hydrated  Psychiatric:Mood and affect appropriate  Neurologic:Cranial Nerves II-XII grossly intact  Musculoskeletal:normal    Imaging

## 2025-04-03 ENCOUNTER — HOSPITAL ENCOUNTER (OUTPATIENT)
Dept: ULTRASOUND IMAGING | Facility: CLINIC | Age: 50
Discharge: HOME | End: 2025-04-03
Payer: COMMERCIAL

## 2025-04-03 ENCOUNTER — HOSPITAL ENCOUNTER (OUTPATIENT)
Dept: MAMMOGRAPHY | Facility: CLINIC | Age: 50
Discharge: HOME | End: 2025-04-03
Payer: COMMERCIAL

## 2025-04-03 VITALS — BODY MASS INDEX: 30.76 KG/M2 | HEIGHT: 67 IN | WEIGHT: 196 LBS

## 2025-04-03 DIAGNOSIS — R92.8 ABNORMAL FINDINGS ON DIAGNOSTIC IMAGING OF BREAST: ICD-10-CM

## 2025-04-03 PROCEDURE — 76642 ULTRASOUND BREAST LIMITED: CPT

## 2025-04-03 PROCEDURE — G0279 TOMOSYNTHESIS, MAMMO: HCPCS

## 2025-04-03 PROCEDURE — 77065 DX MAMMO INCL CAD UNI: CPT

## 2025-04-04 ENCOUNTER — RESULTS FOLLOW-UP (OUTPATIENT)
Age: 50
End: 2025-04-04

## 2025-04-04 DIAGNOSIS — R92.8 ABNORMAL MAMMOGRAM: Primary | ICD-10-CM

## 2025-04-12 ENCOUNTER — HOSPITAL ENCOUNTER (OUTPATIENT)
Dept: MRI IMAGING | Facility: HOSPITAL | Age: 50
Discharge: HOME | End: 2025-04-12
Attending: PHYSICAL MEDICINE & REHABILITATION
Payer: COMMERCIAL

## 2025-04-12 DIAGNOSIS — I10 BENIGN ESSENTIAL HTN: ICD-10-CM

## 2025-04-12 DIAGNOSIS — M51.369 DEGENERATION OF INTERVERTEBRAL DISC OF LUMBAR REGION WITHOUT DISCOGENIC BACK PAIN OR LOWER EXTREMITY PAIN: ICD-10-CM

## 2025-04-12 PROCEDURE — 72148 MRI LUMBAR SPINE W/O DYE: CPT

## 2025-04-14 ENCOUNTER — TELEPHONE (OUTPATIENT)
Age: 50
End: 2025-04-14

## 2025-04-14 RX ORDER — LISINOPRIL 20 MG/1
20 TABLET ORAL DAILY
Qty: 100 TABLET | Refills: 0 | OUTPATIENT
Start: 2025-04-14

## 2025-04-14 NOTE — TELEPHONE ENCOUNTER
Caller: Joanne CRUZ    Doctor: Dr. Ralph     Reason for call: Pt stated that someone from [facility] called her in regard to the MRI . No notes in chart . Please advise     Call back#: [phone]

## 2025-04-16 ENCOUNTER — RESULTS FOLLOW-UP (OUTPATIENT)
Dept: PAIN MEDICINE | Facility: CLINIC | Age: 50
End: 2025-04-16

## 2025-04-16 NOTE — TELEPHONE ENCOUNTER
Patient has been scheduled for their procedure, please see [employer] message for additional details.

## 2025-04-16 NOTE — TELEPHONE ENCOUNTER
----- Message from Lillie ELAINE sent at 4/14/2025  2:23 PM EDT -----  S/w pt and advised of results. Pt would like to schedule L5-S1 LESI.     Pt is not Diabetic and denies taking any anti-coagulants.  ----- Message -----  From: Lillie Jenkins RN  Sent: 4/14/2025  10:52 AM EDT  To: [facility]    Attempted to reach pt. VMMLOM with CB # and OH.  ----- Message -----  From: Aime Ralph DO  Sent: 4/14/2025  10:37 AM EDT  To: [facility]; #    Clinical please notify patient MRI lumbar spine results demonstrate a left paracentral disc protrusion at L5-S1 likely contributing to the ongoing left-sided low back and radiating pain  Would consider an L5-S1 LESI under fluoroscopy guidance  If interested and amenable please schedule  Thank you  ----- Message -----  From: Interface, Radiology Results In  Sent: 4/13/2025   7:06 PM EDT  To: Aime Ralph DO

## 2025-04-25 NOTE — TELEPHONE ENCOUNTER
Pt called to ask if her refill for Lisinopril had been sent in yet.  She had requested it on 4/12/25.  Made her aware there is a refill remaining on it and she should call [facility] to request the refill.  Pt understood.

## 2025-04-29 ENCOUNTER — ANNUAL EXAM (OUTPATIENT)
Age: 50
End: 2025-04-29

## 2025-04-29 VITALS
BODY MASS INDEX: 31.04 KG/M2 | DIASTOLIC BLOOD PRESSURE: 70 MMHG | HEIGHT: 67 IN | WEIGHT: 197.8 LBS | SYSTOLIC BLOOD PRESSURE: 122 MMHG

## 2025-04-29 DIAGNOSIS — Z01.419 ENCOUNTER FOR ANNUAL ROUTINE GYNECOLOGICAL EXAMINATION: Primary | ICD-10-CM

## 2025-04-29 DIAGNOSIS — Z30.41 ENCOUNTER FOR SURVEILLANCE OF CONTRACEPTIVE PILLS: ICD-10-CM

## 2025-04-29 DIAGNOSIS — Z11.51 SCREENING FOR HUMAN PAPILLOMAVIRUS (HPV): ICD-10-CM

## 2025-04-29 DIAGNOSIS — Z12.4 SCREENING FOR CERVICAL CANCER: ICD-10-CM

## 2025-04-29 PROBLEM — N92.6 ABNORMAL BLEEDING IN MENSTRUAL CYCLE: Status: RESOLVED | Noted: 2019-01-28 | Resolved: 2025-04-29

## 2025-04-29 PROBLEM — D25.9 FIBROID UTERUS: Status: ACTIVE | Noted: 2025-04-29

## 2025-04-29 PROCEDURE — G0145 SCR C/V CYTO,THINLAYER,RESCR: HCPCS | Performed by: OBSTETRICS & GYNECOLOGY

## 2025-04-29 PROCEDURE — G0476 HPV COMBO ASSAY CA SCREEN: HCPCS | Performed by: OBSTETRICS & GYNECOLOGY

## 2025-04-29 RX ORDER — LEVONORGESTREL/ETHIN.ESTRADIOL 0.1-0.02MG
1 TABLET ORAL DAILY
Qty: 84 TABLET | Refills: 3 | Status: SHIPPED | OUTPATIENT
Start: 2025-04-29

## 2025-04-29 NOTE — PROGRESS NOTES
Assessment/Plan:    1. Encounter for annual routine gynecological examination (Primary)      2. Screening for cervical cancer    - Liquid-based pap, screening    3. Screening for human papillomavirus (HPV)    - Liquid-based pap, screening    4. Encounter for surveillance of contraceptive pills    - levonorgestrel-ethinyl estradiol (Aviane) 0.1-20 MG-MCG per tablet; Take 1 tablet by mouth daily  Dispense: 84 tablet; Refill: 3          Subjective      Joanne Damon is a 49 y.o. female who presents for annual exam. Periods are well-controlled on OCP; has occasional brown spotting.  She denies any breast, urinary or sexual concerns.    Current contraception: OCP (estrogen/progesterone) and tubal ligation  History of abnormal Pap smear: yes  Regular self breast exam: yes  History of abnormal mammogram: no  History of abnormal lipids: no    Menstrual History:  OB History          4    Para   3    Term   3       0    AB   1    Living   3         SAB   0    IAB   0    Ectopic   0    Multiple   0    Live Births   3       Menarche age: 13  Patient's last menstrual period was 2025 (exact date).  Period Cycle (Days): 28  Period Duration (Days): 4-5  Period Pattern: Regular  Menstrual Flow: Light  Dysmenorrhea: (!) Mild  Dysmenorrhea Symptoms: Cramping    Past Medical History:   Diagnosis Date    Abnormal Pap smear of cervix     Allergic rhinitis     Anemia     Breast cyst     Dizziness     Hypertension     Miscarriage 2012    1 miscarriage when I had my tubes tied    Seasonal allergies     Vertigo        Family History   Problem Relation Age of Onset    Hypertension Mother     Diabetes Mother     Thyroid disease Mother         Graves Disease    Sarcoidosis Father     Breast cancer Sister 45        3 years ago    Hypertension Sister     No Known Problems Sister     No Known Problems Daughter     Breast cancer Maternal Grandmother 70        When she was 65    Diabetes Maternal Grandmother     No  "Known Problems Maternal Grandfather     No Known Problems Paternal Grandmother     No Known Problems Paternal Grandfather     No Known Problems Maternal Aunt     No Known Problems Maternal Aunt     No Known Problems Maternal Aunt     No Known Problems Maternal Aunt     No Known Problems Maternal Aunt     No Known Problems Paternal Aunt     No Known Problems Paternal Aunt     No Known Problems Paternal Aunt     No Known Problems Paternal Aunt     No Known Problems Son     No Known Problems Son     Hypertension Brother     Diabetes Brother     Asthma Brother     Diabetes Sister         Diabetic since 11 years old       The following portions of the patient's history were reviewed and updated as appropriate: allergies, current medications, past family history, past medical history, past social history, past surgical history, and problem list.    Review of Systems  Pertinent items are noted in HPI.      Objective      /70 (BP Location: Right arm, Patient Position: Sitting, Cuff Size: Large)   Ht 5' 7.25\" (1.708 m)   Wt 89.7 kg (197 lb 12.8 oz)   LMP 03/29/2025 (Exact Date)   BMI 30.75 kg/m²     General:   alert and oriented, in no acute distress   Heart:  Breasts: regular rate and rhythm  appear normal, no suspicious masses, no skin or nipple changes or axillary nodes.   Lungs: Effort normal   Abdomen: soft, non-tender, without masses or organomegaly   Vulva: normal   Vagina: normal mucosa   Cervix: no lesions   Uterus: normal size, mobile, non-tender   Adnexa:  Bladder: normal adnexa and no mass, fullness, tenderness  Non-tenderness               "

## 2025-05-02 ENCOUNTER — RESULTS FOLLOW-UP (OUTPATIENT)
Age: 50
End: 2025-05-02

## 2025-05-02 LAB
LAB AP GYN PRIMARY INTERPRETATION: NORMAL
LAB AP LMP: NORMAL
Lab: NORMAL
PATH INTERP SPEC-IMP: NORMAL

## 2025-05-16 ENCOUNTER — TELEPHONE (OUTPATIENT)
Dept: PAIN MEDICINE | Facility: CLINIC | Age: 50
End: 2025-05-16

## 2025-05-16 NOTE — TELEPHONE ENCOUNTER
Left pt a message to confirm procedure date 5/21 @ 11:10 [facility] pt also was told to call office or 4 week F/U

## 2025-05-21 ENCOUNTER — HOSPITAL ENCOUNTER (OUTPATIENT)
Dept: RADIOLOGY | Facility: HOSPITAL | Age: 50
Discharge: HOME | End: 2025-05-21
Attending: PHYSICAL MEDICINE & REHABILITATION | Admitting: PHYSICAL MEDICINE & REHABILITATION
Payer: COMMERCIAL

## 2025-05-21 VITALS
DIASTOLIC BLOOD PRESSURE: 69 MMHG | OXYGEN SATURATION: 99 % | RESPIRATION RATE: 16 BRPM | HEART RATE: 66 BPM | TEMPERATURE: 97.5 F | SYSTOLIC BLOOD PRESSURE: 132 MMHG

## 2025-05-21 DIAGNOSIS — M54.16 LUMBAR RADICULOPATHY: ICD-10-CM

## 2025-05-21 PROCEDURE — 62323 NJX INTERLAMINAR LMBR/SAC: CPT | Performed by: PHYSICAL MEDICINE & REHABILITATION

## 2025-05-21 RX ORDER — METHYLPREDNISOLONE ACETATE 80 MG/ML
80 INJECTION, SUSPENSION INTRA-ARTICULAR; INTRALESIONAL; INTRAMUSCULAR; PARENTERAL; SOFT TISSUE ONCE
Status: COMPLETED | OUTPATIENT
Start: 2025-05-21 | End: 2025-05-21

## 2025-05-21 RX ADMIN — METHYLPREDNISOLONE ACETATE 80 MG: 80 INJECTION, SUSPENSION INTRA-ARTICULAR; INTRALESIONAL; INTRAMUSCULAR; SOFT TISSUE at 11:17

## 2025-05-21 RX ADMIN — IOHEXOL 1 ML: 300 INJECTION, SOLUTION INTRAVENOUS at 11:17

## 2025-05-21 NOTE — DISCHARGE INSTR - LAB
Epidural Steroid Injection   WHAT YOU NEED TO KNOW:   An epidural steroid injection (JESS) is a procedure to inject steroid medicine into the epidural space. The epidural space is between your spinal cord and vertebrae. Steroids reduce inflammation and fluid buildup in your spine that may be causing pain. You may be given pain medicine along with the steroids.          ACTIVITY  Do not drive or operate machinery today.  No strenuous activity today - bending, lifting, etc.  You may resume normal activites starting tomorrow - start slowly and as tolerated.  You may shower today, but no tub baths or hot tubs.  You may have numbness for several hours from the local anesthetic. Please use caution and common sense, especially with weight-bearing activities.    CARE OF THE INJECTION SITE  If you have soreness or pain, apply ice to the area today (20 minutes on/20 minutes off).  Starting tomorrow, you may use warm, moist heat or ice if needed.  You may have an increase or change in your discomfort for 36-48 hours after your treatment.  Apply ice and continue with any pain medication you have been prescribed.  Notify the [facility] if you have any of the following: redness, drainage, swelling, headache, stiff neck or fever above 100°F.    SPECIAL INSTRUCTIONS  Our office will contact you in approximately 14 days for a progress report.    MEDICATIONS  Continue to take all routine medications.  Our office may have instructed you to hold some medications.    As no general anesthesia was used in today's procedure, you should not experience any side effects related to anesthesia.     If you are diabetic, the steroids used in today's injection may temporarily increase your blood sugar levels after the first few days after your injection. Please keep a close eye on your sugars and alert the doctor who manages your diabetes if your sugars are significantly high from your baseline or you are symptomatic.     If you have a  problem specifically related to your procedure, please call our office at [phone].  Problems not related to your procedure should be directed to your primary care physician.

## 2025-05-21 NOTE — H&P
History of Present Illness: The patient is a 49 y.o. female who presents with complaints of low back pain    Past Medical History:   Diagnosis Date    Abnormal Pap smear of cervix     Allergic rhinitis     Anemia     Breast cyst 1994    Dizziness     Hypertension 2023    Miscarriage 9/2012    1 miscarriage when I had my tubes tied    Seasonal allergies     Vertigo        Past Surgical History:   Procedure Laterality Date    ARTHROTOMY ANKLE  02/05/2021    Procedure: ARTHROTOMY RADIAL CARPAL TUNNEL JOINT;  Surgeon: Alix Alcazar MD;  [facility];  Service: Orthopedics    BREAST EXCISIONAL BIOPSY Left 1994    benign    BREAST SURGERY  1994    Lump to left breast removed B9    GYNECOLOGIC CRYOSURGERY      x2    CA EXCISION GANGLION WRIST DORSAL/VOLAR PRIMARY Right 02/05/2021    Procedure: EXCISION GANGLION CYST;  Surgeon: lAix Alcazar MD;  [facility];  Service: Orthopedics    TUBAL LIGATION  2008    Tubes are no longer tied       Current Medications[1]    Allergies[2]    Physical Exam: There were no vitals filed for this visit.  General: Awake, Alert, Oriented x 3, Mood and affect appropriate  Respiratory: Respirations even and unlabored  Cardiovascular: Peripheral pulses intact; no edema  Musculoskeletal Exam: Tenderness palpation bilateral lumbar paraspinals    ASA Score: 2         Assessment:   1. Lumbar radiculopathy        Plan: L5-S1 LESI        [1]   Current Outpatient Medications:     levonorgestrel-ethinyl estradiol (Aviane) 0.1-20 MG-MCG per tablet, Take 1 tablet by mouth daily, Disp: 84 tablet, Rfl: 3    lisinopril (ZESTRIL) 20 mg tablet, Take 1 tablet (20 mg total) by mouth daily, Disp: 100 tablet, Rfl: 1    Current Facility-Administered Medications:     iohexol (OMNIPAQUE) 300 mg/mL injection 1 mL, 1 mL, Epidural, Once, Aime Ralph DO    methylPREDNISolone acetate (DEPO-MEDROL) injection 80 mg, 80 mg, Epidural, Once, Aime Ralph DO  [2]   Allergies  Allergen  Reactions    Shellfish-Derived Products - Food Allergy Throat Swelling

## 2025-05-31 DIAGNOSIS — Z30.41 ENCOUNTER FOR SURVEILLANCE OF CONTRACEPTIVE PILLS: ICD-10-CM

## 2025-06-02 RX ORDER — LEVONORGESTREL/ETHIN.ESTRADIOL 0.1-0.02MG
1 TABLET ORAL DAILY
Qty: 84 TABLET | Refills: 1 | Status: SHIPPED | OUTPATIENT
Start: 2025-06-02

## 2025-06-04 ENCOUNTER — TELEPHONE (OUTPATIENT)
Dept: PAIN MEDICINE | Facility: CLINIC | Age: 50
End: 2025-06-04

## 2025-06-24 ENCOUNTER — PATIENT MESSAGE (OUTPATIENT)
Dept: FAMILY MEDICINE CLINIC | Facility: CLINIC | Age: 50
End: 2025-06-24

## 2025-06-24 DIAGNOSIS — R25.2 LEG CRAMPS: Primary | ICD-10-CM

## 2025-06-25 ENCOUNTER — OFFICE VISIT (OUTPATIENT)
Age: 50
End: 2025-06-25
Payer: COMMERCIAL

## 2025-06-25 VITALS — SYSTOLIC BLOOD PRESSURE: 148 MMHG | DIASTOLIC BLOOD PRESSURE: 88 MMHG | BODY MASS INDEX: 30.63 KG/M2 | WEIGHT: 197 LBS

## 2025-06-25 DIAGNOSIS — L73.9 FOLLICULITIS: Primary | ICD-10-CM

## 2025-06-25 PROCEDURE — 99212 OFFICE O/P EST SF 10 MIN: CPT | Performed by: OBSTETRICS & GYNECOLOGY

## 2025-06-25 RX ORDER — MUPIROCIN 2 %
OINTMENT (GRAM) TOPICAL 3 TIMES DAILY
Qty: 60 G | Refills: 4 | Status: SHIPPED | OUTPATIENT
Start: 2025-06-25

## 2025-06-25 NOTE — PATIENT INSTRUCTIONS
Try exfoliation 1-2x/wk, warm compresses and apply mupirocin ointment as needed.  Salicylic acid body wash.    2.   Patient verbalized understanding of today's discussion with all questions and concerns addressed to her satisfaction.

## 2025-06-25 NOTE — PROGRESS NOTES
What we talked about today:    Patient Instructions    Try exfoliation 1-2x/wk, warm compresses and apply mupirocin ointment as needed.  Salicylic acid body wash.    2.   Patient verbalized understanding of today's discussion with all questions and concerns addressed to her satisfaction.          Assessment/Plan:    1. Folliculitis  -     mupirocin (BACTROBAN) 2 % ointment; Apply topically 3 (three) times a day Apply to affected areas.          Subjective:      Patient ID: Joanne Damon is a 49 y.o. female, presents today complaining of ingrown hairs after shaving.     HPI:    Pt states does not exfoliate or use warm compresses.  Did try to pick at it this time.         The following portions of the patient's history were reviewed and updated as appropriate: allergies, current medications, past family history, past medical history, past social history, past surgical history, and problem list.      Review of Systems   Constitutional:  Negative for chills, fatigue and fever.   HENT: Negative.     Eyes: Negative.    Respiratory: Negative.     Cardiovascular: Negative.    Gastrointestinal: Negative.  Negative for abdominal distention and abdominal pain.   Endocrine: Negative.    Musculoskeletal: Negative.    Skin: Negative.    Allergic/Immunologic: Negative.    Neurological: Negative.    Hematological: Negative.    Psychiatric/Behavioral: Negative.     All other systems reviewed and are negative.            Objective:      /88   Wt 89.4 kg (197 lb)   LMP 06/04/2025   BMI 30.63 kg/m²        Physical Exam  Vitals reviewed.   Constitutional:       General: She is not in acute distress.     Appearance: Normal appearance. She is not ill-appearing.   HENT:      Head: Normocephalic and atraumatic.     Eyes:      Extraocular Movements: Extraocular movements intact.     Pulmonary:      Effort: Pulmonary effort is normal.   Genitourinary:       Comments: Purplish area, no s/s infx.    Musculoskeletal:      Cervical  back: Normal range of motion.     Skin:     General: Skin is warm and dry.     Neurological:      Mental Status: She is alert and oriented to person, place, and time.     Psychiatric:         Mood and Affect: Mood normal.         Behavior: Behavior normal.         Thought Content: Thought content normal.         Judgment: Judgment normal.                 FAWAD Cannon MD, FACOG

## 2025-07-03 ENCOUNTER — APPOINTMENT (OUTPATIENT)
Dept: LAB | Facility: CLINIC | Age: 50
End: 2025-07-03
Payer: COMMERCIAL

## 2025-07-03 DIAGNOSIS — R25.2 LEG CRAMPS: ICD-10-CM

## 2025-07-03 DIAGNOSIS — D50.9 IRON DEFICIENCY ANEMIA, UNSPECIFIED IRON DEFICIENCY ANEMIA TYPE: ICD-10-CM

## 2025-07-03 DIAGNOSIS — E55.9 VITAMIN D INSUFFICIENCY: ICD-10-CM

## 2025-07-03 DIAGNOSIS — R73.03 PREDIABETES: ICD-10-CM

## 2025-07-03 DIAGNOSIS — I10 BENIGN ESSENTIAL HTN: ICD-10-CM

## 2025-07-03 LAB
25(OH)D3 SERPL-MCNC: 27.9 NG/ML (ref 30–100)
ALBUMIN SERPL BCG-MCNC: 4.4 G/DL (ref 3.5–5)
ALP SERPL-CCNC: 47 U/L (ref 34–104)
ALT SERPL W P-5'-P-CCNC: 26 U/L (ref 7–52)
ANION GAP SERPL CALCULATED.3IONS-SCNC: 10 MMOL/L (ref 4–13)
AST SERPL W P-5'-P-CCNC: 19 U/L (ref 13–39)
BASOPHILS # BLD AUTO: 0.05 THOUSANDS/ÂΜL (ref 0–0.1)
BASOPHILS NFR BLD AUTO: 1 % (ref 0–1)
BILIRUB SERPL-MCNC: 0.43 MG/DL (ref 0.2–1)
BUN SERPL-MCNC: 10 MG/DL (ref 5–25)
CALCIUM SERPL-MCNC: 9.6 MG/DL (ref 8.4–10.2)
CHLORIDE SERPL-SCNC: 102 MMOL/L (ref 96–108)
CHOLEST SERPL-MCNC: 199 MG/DL (ref ?–200)
CO2 SERPL-SCNC: 27 MMOL/L (ref 21–32)
CREAT SERPL-MCNC: 1 MG/DL (ref 0.6–1.3)
CREAT UR-MCNC: 192.3 MG/DL
EOSINOPHIL # BLD AUTO: 0.2 THOUSAND/ÂΜL (ref 0–0.61)
EOSINOPHIL NFR BLD AUTO: 3 % (ref 0–6)
ERYTHROCYTE [DISTWIDTH] IN BLOOD BY AUTOMATED COUNT: 13.3 % (ref 11.6–15.1)
FERRITIN SERPL-MCNC: 44 NG/ML (ref 30–307)
GFR SERPL CREATININE-BSD FRML MDRD: 67 ML/MIN/1.73SQ M
GLUCOSE P FAST SERPL-MCNC: 107 MG/DL (ref 65–99)
HCT VFR BLD AUTO: 41.4 % (ref 34.8–46.1)
HDLC SERPL-MCNC: 56 MG/DL
HGB BLD-MCNC: 13.2 G/DL (ref 11.5–15.4)
IMM GRANULOCYTES # BLD AUTO: 0.02 THOUSAND/UL (ref 0–0.2)
IMM GRANULOCYTES NFR BLD AUTO: 0 % (ref 0–2)
IRON SATN MFR SERPL: 17 % (ref 15–50)
IRON SERPL-MCNC: 85 UG/DL (ref 50–212)
LDLC SERPL CALC-MCNC: 115 MG/DL (ref 0–100)
LYMPHOCYTES # BLD AUTO: 2.09 THOUSANDS/ÂΜL (ref 0.6–4.47)
LYMPHOCYTES NFR BLD AUTO: 26 % (ref 14–44)
MAGNESIUM SERPL-MCNC: 2.3 MG/DL (ref 1.9–2.7)
MCH RBC QN AUTO: 30.1 PG (ref 26.8–34.3)
MCHC RBC AUTO-ENTMCNC: 31.9 G/DL (ref 31.4–37.4)
MCV RBC AUTO: 95 FL (ref 82–98)
MICROALBUMIN UR-MCNC: <7 MG/L
MONOCYTES # BLD AUTO: 0.7 THOUSAND/ÂΜL (ref 0.17–1.22)
MONOCYTES NFR BLD AUTO: 9 % (ref 4–12)
NEUTROPHILS # BLD AUTO: 4.9 THOUSANDS/ÂΜL (ref 1.85–7.62)
NEUTS SEG NFR BLD AUTO: 61 % (ref 43–75)
NONHDLC SERPL-MCNC: 143 MG/DL
NRBC BLD AUTO-RTO: 0 /100 WBCS
PLATELET # BLD AUTO: 395 THOUSANDS/UL (ref 149–390)
PMV BLD AUTO: 9.1 FL (ref 8.9–12.7)
POTASSIUM SERPL-SCNC: 4.4 MMOL/L (ref 3.5–5.3)
PROT SERPL-MCNC: 7.7 G/DL (ref 6.4–8.4)
RBC # BLD AUTO: 4.38 MILLION/UL (ref 3.81–5.12)
SODIUM SERPL-SCNC: 139 MMOL/L (ref 135–147)
TIBC SERPL-MCNC: 499.8 UG/DL (ref 250–450)
TRANSFERRIN SERPL-MCNC: 357 MG/DL (ref 203–362)
TRIGL SERPL-MCNC: 140 MG/DL (ref ?–150)
UIBC SERPL-MCNC: 415 UG/DL (ref 155–355)
VIT B12 SERPL-MCNC: 339 PG/ML (ref 180–914)
WBC # BLD AUTO: 7.96 THOUSAND/UL (ref 4.31–10.16)

## 2025-07-03 PROCEDURE — 83540 ASSAY OF IRON: CPT

## 2025-07-03 PROCEDURE — 80053 COMPREHEN METABOLIC PANEL: CPT

## 2025-07-03 PROCEDURE — 82728 ASSAY OF FERRITIN: CPT

## 2025-07-03 PROCEDURE — 82306 VITAMIN D 25 HYDROXY: CPT

## 2025-07-03 PROCEDURE — 80061 LIPID PANEL: CPT

## 2025-07-03 PROCEDURE — 82607 VITAMIN B-12: CPT

## 2025-07-03 PROCEDURE — 85025 COMPLETE CBC W/AUTO DIFF WBC: CPT

## 2025-07-03 PROCEDURE — 83550 IRON BINDING TEST: CPT

## 2025-07-03 PROCEDURE — 83735 ASSAY OF MAGNESIUM: CPT

## 2025-07-03 PROCEDURE — 36415 COLL VENOUS BLD VENIPUNCTURE: CPT

## 2025-07-11 ENCOUNTER — OFFICE VISIT (OUTPATIENT)
Dept: PAIN MEDICINE | Facility: CLINIC | Age: 50
End: 2025-07-11
Payer: COMMERCIAL

## 2025-07-11 DIAGNOSIS — M51.16 LUMBAR DISC DISEASE WITH RADICULOPATHY: Primary | ICD-10-CM

## 2025-07-11 PROCEDURE — 99213 OFFICE O/P EST LOW 20 MIN: CPT | Performed by: PHYSICAL MEDICINE & REHABILITATION

## 2025-07-11 NOTE — PROGRESS NOTES
Name: Joanne Damon      : 1975      MRN: [id number]  Encounter Provider: Aime Ralph DO  Encounter Date: 2025   Encounter department: [facility]  :  Assessment & Plan  Lumbar disc disease with radiculopathy       51 y/o F who presents for f-up visit s/p L5-S1 LESI on 2025.  Pt cites ~ 80% improvement with the injection, sustained thus far.  Pt w/out complaints and improved pain. Physical exam benign. She in interested in doing Yoga, reassured on it as it may help her back pain.  Home exercise program discussed and provided in paper.      My impressions and treatment recommendations were discussed in detail with the patient who verbalized understanding and had no further questions.  Discharge instructions were provided. I personally saw and examined the patient and I agree with the above discussed plan of care.    History of Present Illness     51 y/o F who presents for f-up visit s/p L5-S1 LESI on 2025.  Pt cites ~ 80% improvement with the injection, sustained thus far.  Pt denies side effects and at present w/out complaints.      Last UDS Date: No results in last 5 years                    last taken on    Review of Systems   Respiratory:  Negative for shortness of breath.    Cardiovascular:  Negative for chest pain.   Gastrointestinal:  Negative for constipation, diarrhea, nausea and vomiting.   Musculoskeletal:  Negative for arthralgias, back pain, gait problem, joint swelling and myalgias.   Skin:  Negative for rash.   Neurological:  Negative for dizziness, seizures and weakness.   All other systems reviewed and are negative.      Medical History Reviewed by provider this encounter:     .       Objective   LMP 2025         Physical Exam  Constitutional: normal, well developed, well nourished, alert, in no distress and non-toxic and no overt pain behavior.  Eyes: anicteric  HEENT: grossly intact  Neck: supple, symmetric, trachea midline and no masses    Pulmonary: even and unlabored  Cardiovascular: No edema or pitting edema present  Skin: Normal without rashes or lesions and well hydrated  Psychiatric: Mood and affect appropriate  Neurologic: Cranial Nerves II-XII grossly intact  Musculoskeletal: normal      Inspection -  No gross appendicular or axial deformities    Tone - No gross muscle atrophy    Palpation -  No tenderness to palpation in lumbosacral pvms    Sensory -  Intact to light touch and pinprick at lower extremities.     ROM - Full in all planes    MMT -    R L   Hip Flexion  5/5 5/5   Knee Extension 5/5 5/5   Ankle Dorsiflexion 5/5 5/5   Ankle Plantarflexion 5/5 5/5   Long Toe Extension 5/5 5/5      Reflexes -    R L   Patellar  +2 +2   Achilles  +2 +2   Clonus   Neg Neg    Gait - Normal.

## 2025-07-21 ENCOUNTER — OFFICE VISIT (OUTPATIENT)
Dept: FAMILY MEDICINE CLINIC | Facility: CLINIC | Age: 50
End: 2025-07-21
Payer: COMMERCIAL

## 2025-07-21 VITALS
HEART RATE: 96 BPM | TEMPERATURE: 97.8 F | WEIGHT: 198 LBS | DIASTOLIC BLOOD PRESSURE: 88 MMHG | SYSTOLIC BLOOD PRESSURE: 138 MMHG | BODY MASS INDEX: 31.08 KG/M2 | HEIGHT: 67 IN | OXYGEN SATURATION: 97 %

## 2025-07-21 DIAGNOSIS — I10 BENIGN ESSENTIAL HTN: Primary | ICD-10-CM

## 2025-07-21 DIAGNOSIS — D50.9 IRON DEFICIENCY ANEMIA, UNSPECIFIED IRON DEFICIENCY ANEMIA TYPE: ICD-10-CM

## 2025-07-21 DIAGNOSIS — E78.2 MIXED HYPERLIPIDEMIA: ICD-10-CM

## 2025-07-21 DIAGNOSIS — R25.2 LEG CRAMPS: ICD-10-CM

## 2025-07-21 PROCEDURE — 99214 OFFICE O/P EST MOD 30 MIN: CPT | Performed by: FAMILY MEDICINE

## 2025-07-21 RX ORDER — LISINOPRIL 10 MG/1
10 TABLET ORAL DAILY
Qty: 30 TABLET | Refills: 0 | Status: SHIPPED | OUTPATIENT
Start: 2025-07-21

## 2025-07-21 RX ORDER — LISINOPRIL 20 MG/1
20 TABLET ORAL DAILY
Qty: 100 TABLET | Refills: 1 | Status: SHIPPED | OUTPATIENT
Start: 2025-07-21

## 2025-07-21 RX ORDER — FERROUS SULFATE 325(65) MG
325 TABLET, DELAYED RELEASE (ENTERIC COATED) ORAL EVERY OTHER DAY
COMMUNITY

## 2025-07-21 NOTE — ASSESSMENT & PLAN NOTE
Lab Results   Component Value Date    IRON 85 07/03/2025    TIBC 499.8 (H) 07/03/2025    FERRITIN 44 07/03/2025     Continue taking iron QOD   Daily dosing worsens her constipation   If TIBC and ferttin do no improve, will discuss iron infusion

## 2025-07-21 NOTE — PATIENT INSTRUCTIONS
Start  Lisinopril 30 gm daily   Please limit the Na intake  Consume more water  Start Vitamin D 6517-2898 iu daily   Start b12 500 mcg daily  May also try tonic water or magnesium glycinate for the leg cramps  Dr. HAIR

## 2025-07-21 NOTE — PROGRESS NOTES
Name: Joanne Damon      : 1975      MRN: [id number]  Encounter Provider: Macarena Tristan MD  Encounter Date: 2025   [facility]  :  Assessment & Plan  Benign essential HTN  Seen today with elevated ambulatory readings  Bps readings from 130-140's/90's and associated with headaches  Denies chest pain, dizziness, blurry vision, or vomiting.  Currently taking lisinopril 20 mg daily   Consuming more salt ( walker)   Will increased lisinopril to 30 mg daily and asked to limit the NA intake      Orders:    lisinopril (ZESTRIL) 20 mg tablet; Take 1 tablet (20 mg total) by mouth daily    lisinopril (ZESTRIL) 10 mg tablet; Take 1 tablet (10 mg total) by mouth daily    Leg cramps  B12 low. Mg, Ca, and K normal. Reccommended b12 and tonic water. May try magnesium for cramps ( topical or oral)       Mixed hyperlipidemia  Lab Results   Component Value Date    CHOLESTEROL 199 2025    CHOLESTEROL 183 2024    CHOLESTEROL 179 2023     Lab Results   Component Value Date    HDL 56 2025    HDL 56 2024    HDL 54 2023     Lab Results   Component Value Date    TRIG 140 2025    TRIG 133 2024    TRIG 130 2023     Lab Results   Component Value Date    NONHDLC 143 2025    NONHDLC 127 2024    NONHDLC 125 2023     Discussed diet and states she was eating more walker and eggs at work  Advise pt to limit the cholesteterol       Iron deficiency anemia, unspecified iron deficiency anemia type  Lab Results   Component Value Date    IRON 85 2025    TIBC 499.8 (H) 2025    FERRITIN 44 2025     Continue taking iron QOD   Daily dosing worsens her constipation   If TIBC and ferttin do no improve, will discuss iron infusion                History of Present Illness   Seen today with concerning BP readings   Joanne has been experiencing headaches the last few weks   She decided to log her pressures and has gotten  "reading iin the  130-140/ 90   Takign lisinopril 20 mg daily  No chest pain, blurry vision,    Headacjes primarrily frontal  VitamiN Dwas takin n200 iu daily       Review of Systems   Eyes:  Negative for visual disturbance.   Respiratory:  Negative for shortness of breath.    Cardiovascular:  Negative for chest pain, palpitations and leg swelling.   Gastrointestinal: Negative.    Musculoskeletal:         Leg cramps    Neurological:  Positive for headaches (frontal headaches). Negative for dizziness and weakness.       Objective   /88 (BP Location: Left arm, Patient Position: Sitting, Cuff Size: Large)   Pulse 96   Temp 97.8 °F (36.6 °C)   Ht 5' 7.25\" (1.708 m)   Wt 89.8 kg (198 lb)   LMP 06/04/2025   SpO2 97%   Breastfeeding No   BMI 30.78 kg/m²      Physical Exam  Constitutional:       General: She is not in acute distress.     Appearance: Normal appearance. She is not ill-appearing or toxic-appearing.   HENT:      Head: Normocephalic and atraumatic.     Eyes:      Extraocular Movements: Extraocular movements intact.       Cardiovascular:      Rate and Rhythm: Normal rate and regular rhythm.      Heart sounds: No murmur heard.  Pulmonary:      Effort: Pulmonary effort is normal. No respiratory distress.      Breath sounds: Normal breath sounds. No stridor. No wheezing, rhonchi or rales.     Neurological:      Mental Status: She is alert and oriented to person, place, and time.     Psychiatric:         Mood and Affect: Mood normal.         Behavior: Behavior normal.         "

## (undated) DEVICE — STERILE BETHLEHEM PLASTIC HAND: Brand: CARDINAL HEALTH

## (undated) DEVICE — CUFF TOURNIQUET 18 X 4 IN QUICK CONNECT DISP 1 BLADDER

## (undated) DEVICE — MINI BLADE ROUND TIP SHARP ON ONE SIDE

## (undated) DEVICE — 3M™ STERI-STRIP™ REINFORCED ADHESIVE SKIN CLOSURES, R1547, 1/2 IN X 4 IN (12 MM X 100 MM), 6 STRIPS/ENVELOPE: Brand: 3M™ STERI-STRIP™

## (undated) DEVICE — SPONGE PVP SCRUB WING STERILE

## (undated) DEVICE — DISPOSABLE EQUIPMENT COVER: Brand: SMALL TOWEL DRAPE

## (undated) DEVICE — INTENDED FOR TISSUE SEPARATION, AND OTHER PROCEDURES THAT REQUIRE A SHARP SURGICAL BLADE TO PUNCTURE OR CUT.: Brand: BARD-PARKER ® CARBON RIB-BACK BLADES

## (undated) DEVICE — SUT PROLENE 4-0 PS-2 18 IN 8682G

## (undated) DEVICE — GAUZE SPONGES,16 PLY: Brand: CURITY

## (undated) DEVICE — OCCLUSIVE GAUZE STRIP,3% BISMUTH TRIBROMOPHENATE IN PETROLATUM BLEND: Brand: XEROFORM

## (undated) DEVICE — PADDING CAST 2IN COTTON STRL

## (undated) DEVICE — ACE WRAP 4 IN STERILE

## (undated) DEVICE — ADHESIVE SKIN HIGH VISCOSITY EXOFIN 1ML

## (undated) DEVICE — GLOVE INDICATOR PI UNDERGLOVE SZ 7 BLUE

## (undated) DEVICE — PADDING CAST 6IN COTTON STRL

## (undated) DEVICE — GLOVE SRG BIOGEL 7

## (undated) DEVICE — CHLORAPREP HI-LITE 26ML ORANGE